# Patient Record
Sex: MALE | Race: WHITE | NOT HISPANIC OR LATINO | Employment: FULL TIME | URBAN - METROPOLITAN AREA
[De-identification: names, ages, dates, MRNs, and addresses within clinical notes are randomized per-mention and may not be internally consistent; named-entity substitution may affect disease eponyms.]

---

## 2018-01-29 ENCOUNTER — COMMUNICATION - HEALTHEAST (OUTPATIENT)
Dept: TELEHEALTH | Facility: CLINIC | Age: 33
End: 2018-01-29

## 2018-01-29 ENCOUNTER — HOSPITAL ENCOUNTER (OUTPATIENT)
Dept: ULTRASOUND IMAGING | Facility: CLINIC | Age: 33
Discharge: HOME | End: 2018-01-29

## 2018-01-29 ENCOUNTER — OFFICE VISIT - HEALTHEAST (OUTPATIENT)
Dept: FAMILY MEDICINE | Facility: CLINIC | Age: 33
End: 2018-01-29

## 2018-01-29 ENCOUNTER — AMBULATORY - HEALTHEAST (OUTPATIENT)
Dept: FAMILY MEDICINE | Facility: CLINIC | Age: 33
End: 2018-01-29

## 2018-01-29 DIAGNOSIS — M71.21 SYNOVIAL CYST OF RIGHT POPLITEAL SPACE: ICD-10-CM

## 2018-01-29 DIAGNOSIS — M79.661 RIGHT CALF PAIN: ICD-10-CM

## 2018-02-02 ENCOUNTER — RECORDS - HEALTHEAST (OUTPATIENT)
Dept: ADMINISTRATIVE | Facility: OTHER | Age: 33
End: 2018-02-02

## 2018-02-06 ENCOUNTER — RECORDS - HEALTHEAST (OUTPATIENT)
Dept: ADMINISTRATIVE | Facility: OTHER | Age: 33
End: 2018-02-06

## 2018-02-16 ENCOUNTER — RECORDS - HEALTHEAST (OUTPATIENT)
Dept: ADMINISTRATIVE | Facility: OTHER | Age: 33
End: 2018-02-16

## 2018-03-30 ENCOUNTER — RECORDS - HEALTHEAST (OUTPATIENT)
Dept: ADMINISTRATIVE | Facility: OTHER | Age: 33
End: 2018-03-30

## 2019-03-03 ENCOUNTER — COMMUNICATION - HEALTHEAST (OUTPATIENT)
Dept: FAMILY MEDICINE | Facility: CLINIC | Age: 34
End: 2019-03-03

## 2019-03-03 ENCOUNTER — RECORDS - HEALTHEAST (OUTPATIENT)
Dept: GENERAL RADIOLOGY | Facility: CLINIC | Age: 34
End: 2019-03-03

## 2019-03-03 ENCOUNTER — OFFICE VISIT - HEALTHEAST (OUTPATIENT)
Dept: FAMILY MEDICINE | Facility: CLINIC | Age: 34
End: 2019-03-03

## 2019-03-03 DIAGNOSIS — R07.89 ATYPICAL CHEST PAIN: ICD-10-CM

## 2019-03-03 DIAGNOSIS — R07.89 OTHER CHEST PAIN: ICD-10-CM

## 2019-03-03 LAB
ALBUMIN SERPL-MCNC: 3.9 G/DL (ref 3.5–5)
ALP SERPL-CCNC: 38 U/L (ref 45–120)
ALT SERPL W P-5'-P-CCNC: 68 U/L (ref 0–45)
ANION GAP SERPL CALCULATED.3IONS-SCNC: 9 MMOL/L (ref 5–18)
AST SERPL W P-5'-P-CCNC: 39 U/L (ref 0–40)
BASOPHILS # BLD AUTO: 0 THOU/UL (ref 0–0.2)
BASOPHILS NFR BLD AUTO: 1 % (ref 0–2)
BILIRUB SERPL-MCNC: 0.3 MG/DL (ref 0–1)
BUN SERPL-MCNC: 19 MG/DL (ref 8–22)
CALCIUM SERPL-MCNC: 9.6 MG/DL (ref 8.5–10.5)
CHLORIDE BLD-SCNC: 105 MMOL/L (ref 98–107)
CO2 SERPL-SCNC: 28 MMOL/L (ref 22–31)
CREAT SERPL-MCNC: 1.27 MG/DL (ref 0.7–1.3)
D DIMER PPP FEU-MCNC: <0.27 FEU UG/ML
EOSINOPHIL # BLD AUTO: 0.2 THOU/UL (ref 0–0.4)
EOSINOPHIL NFR BLD AUTO: 3 % (ref 0–6)
ERYTHROCYTE [DISTWIDTH] IN BLOOD BY AUTOMATED COUNT: 11.9 % (ref 11–14.5)
GFR SERPL CREATININE-BSD FRML MDRD: >60 ML/MIN/1.73M2
GLUCOSE BLD-MCNC: 88 MG/DL (ref 70–125)
HCT VFR BLD AUTO: 44.8 % (ref 40–54)
HGB BLD-MCNC: 14.8 G/DL (ref 14–18)
LYMPHOCYTES # BLD AUTO: 2.5 THOU/UL (ref 0.8–4.4)
LYMPHOCYTES NFR BLD AUTO: 39 % (ref 20–40)
MCH RBC QN AUTO: 28.8 PG (ref 27–34)
MCHC RBC AUTO-ENTMCNC: 33 G/DL (ref 32–36)
MCV RBC AUTO: 87 FL (ref 80–100)
MONOCYTES # BLD AUTO: 0.5 THOU/UL (ref 0–0.9)
MONOCYTES NFR BLD AUTO: 9 % (ref 2–10)
NEUTROPHILS # BLD AUTO: 3.1 THOU/UL (ref 2–7.7)
NEUTROPHILS NFR BLD AUTO: 49 % (ref 50–70)
PLATELET # BLD AUTO: 386 THOU/UL (ref 140–440)
PMV BLD AUTO: 8.7 FL (ref 8.5–12.5)
POTASSIUM BLD-SCNC: 4.4 MMOL/L (ref 3.5–5)
PROT SERPL-MCNC: 7.3 G/DL (ref 6–8)
RBC # BLD AUTO: 5.13 MILL/UL (ref 4.4–6.2)
SODIUM SERPL-SCNC: 142 MMOL/L (ref 136–145)
TROPONIN I SERPL-MCNC: <0.01 NG/ML (ref 0–0.29)
WBC: 6.4 THOU/UL (ref 4–11)

## 2019-03-06 ENCOUNTER — OFFICE VISIT - HEALTHEAST (OUTPATIENT)
Dept: FAMILY MEDICINE | Facility: CLINIC | Age: 34
End: 2019-03-06

## 2019-03-06 DIAGNOSIS — H53.9 VISION CHANGES: ICD-10-CM

## 2019-03-06 DIAGNOSIS — R07.89 CHEST PRESSURE: ICD-10-CM

## 2019-03-07 LAB
ATRIAL RATE - MUSE: 54 BPM
DIASTOLIC BLOOD PRESSURE - MUSE: NORMAL MMHG
INTERPRETATION ECG - MUSE: NORMAL
P AXIS - MUSE: 57 DEGREES
PR INTERVAL - MUSE: 178 MS
QRS DURATION - MUSE: 86 MS
QT - MUSE: 404 MS
QTC - MUSE: 383 MS
R AXIS - MUSE: 47 DEGREES
SYSTOLIC BLOOD PRESSURE - MUSE: NORMAL MMHG
T AXIS - MUSE: 1 DEGREES
VENTRICULAR RATE- MUSE: 54 BPM

## 2021-05-31 VITALS — BODY MASS INDEX: 35.82 KG/M2 | WEIGHT: 264.1 LBS

## 2021-06-02 VITALS — BODY MASS INDEX: 39.24 KG/M2 | WEIGHT: 289.3 LBS

## 2021-06-02 VITALS — BODY MASS INDEX: 38.92 KG/M2 | WEIGHT: 287 LBS

## 2021-06-15 NOTE — PROGRESS NOTES
Chief Complaint   Patient presents with     calf pain     going on for 1-2 weeks. pain started behind the knee         HPI     Jak Ortega is a 32 y.o. male seen today for right calf pain and swelling.  About 2 weeks ago he woke up with pain behind the right knee which continued for several days and slowly seemed to get better.  Over the last 2 or 3 days he has had pain and swelling in the right calf.  Particularly notes pain when dorsiflexing his foot to take a step.  Denies fever, chills, or nausea.  Denies recent travel or immobility.  Denies recent trauma to his leg.     No current outpatient prescriptions on file.     No current facility-administered medications for this visit.         Reviewed and updated: medical history, medications and allergies.     Review of Systems     General: Denies fever, chills, fatigue.  Cardiovascular: Denies chest pain, dyspnea on exertion, palpitations.  Respiratory: Denies dyspnea, cough, wheezing.  GI: Denies nausea, vomiting, diarrhea, constipation.  : Denies dysuria, polyuria.     Objective     Vitals:    01/29/18 1456   BP: 114/68   Pulse: 74   Temp: 97.6  F (36.4  C)   TempSrc: Oral   SpO2: 99%   Weight: (!) 264 lb 1.6 oz (119.8 kg)        Reviewed vital signs.  General: Appears calm, comfortable. Answers questions quickly and appropriately with clear speech. No apparent distress.  Skin: Pink, warm, dry.  HENT: Normocephalic, atraumatic.  Neck: Supple.  Heart: Strong, regular radial pulse.  Lungs: Normal respiratory effort.  Neuro: Memory and cognition appear normal. Normal gait.  Psych: Mood and affect appear normal.   MSK:  R knee: No redness, swelling, deformity.  Exhibits full active range of motion and bears weight without discomfort.  Knee is stable.  No posterior tenderness.  R calf: Circumference of right calf is 3 cm greater (48 cm) than the left (45 cm).  No rash or erythema.  Calf muscle is soft but very tender.  Resisted dorsiflexion of the foot elicits  calf pain.     Us Venous Leg Right    Result Date: 1/29/2018  US VENOUS LEG RIGHT 1/29/2018 6:33 PM INDICATION: Calf pain and swelling. TECHNIQUE: Routine exam without and with compression, augmentation, and duplex utilizing 2D gray-scale imaging, Doppler interrogation with color-flow and spectral waveform analysis. COMPARISON: None. FINDINGS: The common femoral, femoral, popliteal, and segmentally visualized calf veins were evaluated. The opposite CFV was also included in the evaluation. Right leg veins are negative for deep venous thrombosis. No popliteal cysts.     CONCLUSION: 1.  Right leg veins are negative for DVT. 2.  Small right popliteal fossa Baker's cyst.      No results found for this or any previous visit.       Medical Decision-Making     Jak is a healthy-appearing 32-year-old male who presents with right calf swelling and tenderness.  He has no risk factors for thromboembolus, however given the clear difference in size and the market tenderness in the calf muscle a venous ultrasound seems indicated.  While not described in the radiologist report, the verbal report to me noted a Baker's cyst extending down into the muscle of the calf and is the likely cause of the pain and swelling.  No DVTs were found.  He will resume normal activity and follow-up with orthopedics.     Assessment and Plan     Jak was seen today for calf pain.    Diagnoses and all orders for this visit:    Synovial cyst of right popliteal space  Comments:  Per US, extending down into the calf muscle.  Orders:  -     Ambulatory referral to Orthopedics    Right calf pain  -     US Venous Leg Right        Discussed benefit vs risk of medications, dosing, side effects.  Patient was able to verbalize understanding.  After visit summary was provided for patient.     Dion Jaramillo PA-C

## 2021-06-17 NOTE — PATIENT INSTRUCTIONS - HE
Patient Instructions by Armaan Jones MD at 3/3/2019  3:00 PM     Author: Armaan Jones MD Service: -- Author Type: Physician    Filed: 3/3/2019  4:55 PM Encounter Date: 3/3/2019 Status: Addendum    : Armaan Jones MD (Physician)    Related Notes: Original Note by Armaan Jones MD (Physician) filed at 3/3/2019  4:54 PM       - Your chest x-ray is normal / negative.   - Your EKG is normal except for a slightly low heart rate, which is expected since you exercise regularly.   - A GI cocktail was given in clinic today with some improvement in your chest symptoms. Recommend that you stop your workout supplement, as this could be contributing to your chest pain. Also recommend that you start Zantac (ranitidine) two times a day to treat possible esophagitis.   - Tests for your blood cell counts, electrolytes, kidney function, liver function, and blood sugar level are in process.   - Tests to rule out heart muscle damage and blood clots in your lungs are in process.   - You will be contacted with laboratory results later this evening.   - Follow up with your PCP within the next 3 days to discuss your symptoms and determine if any additional workup or treatment is indicated.   - Recommend that you schedule an eye examination ASAP.       Patient Education     Uncertain Causes of Chest Pain    Chest pain can happen for a number of reasons. Sometimes the cause can't be determined. If your condition does not seem serious, and your pain does not appear to be coming from your heart, your healthcare provider may recommend watching it closely. Sometimes the signs of a serious problem take more time to appear. Many problems not related to your heart can cause chest pain.These include:    Musculoskeletal. Costochondritis, an inflammation of the tissues around the ribs that can occur from trauma or overuse injuries    Respiratory. Pneumonia, pneumothorax, or pneumonitis (inflammation of the lining of the  chest and lungs)    Gastrointestinal. Esophageal reflux, heartburn, or gallbladder disease    Anxiety and panic disorders    Nerve compression and neuritis    Miscellaneous problems such as aortic aneurysm or pulmonary embolism (a blood clot in the lungs)  Home care  After your visit, follow these recommendations:    Rest today and avoid strenuous activity.    Take any prescribed medicine as directed.    Be aware of any recurrent chest pain and notice any changes  Follow-up care  Follow up with your healthcare provider if you do not start to feel better within 24 hours, or as advised.  Call 911  Call 911 if any of these occur:    A change in the type of pain: if it feels different, becomes more severe, lasts longer, or begins to spread into your shoulder, arm, neck, jaw or back    Shortness of breath or increased pain with breathing    Weakness, dizziness, or fainting    Rapid heart beat    Crushing sensation in your chest  When to seek medical advice  Call your healthcare provider right away if any of the following occur:    Cough with dark colored sputum (phlegm) or blood    Fever of 100.4 F (38 C) or higher, or as directed by your healthcare provider    Swelling, pain or redness in one leg    Shortness of breath  Date Last Reviewed: 12/30/2015 2000-2017 [employer]. [address] [place] [address], [place] [address]. All rights reserved. This information is not intended as a substitute for professional medical care. Always follow your healthcare professional's instructions.

## 2021-06-24 NOTE — PROGRESS NOTES
ASSESSMENT:  1. Chest pressure  Patient with recent onset of intermittent recurrent chest pressure, nonexertional, workup thus far negative.  My strong suspicion is that this is related to a noncardiac cause such as anxiety muscle strain or potentially GERD.    2. Vision changes  Patient is having some vision changes probably an unrelated issue        PLAN:  1.  I do encourage the patient to make an appointment with his eye doctor  2.  In terms of the chest pressure overall reassurance and watchful waiting, if this persists or worsens could consider an echocardiogram to rule out any type of valvular or structural heart disease  3.  Follow-up as needed.       No orders of the defined types were placed in this encounter.    There are no discontinued medications.    Return for with PCP .    CHIEF COMPLAINT:  Chief Complaint   Patient presents with     Follow-up     from [facility] for chest pains. Pt says for the most part they have resolved, but still notices some heaviness when he lays down in the evening        SUBJECTIVE:  Jak is a 33 y.o. male who presents for a walk-in clinic follow-up which occurred three days ago. Patient explains that he was seen at the walk-in clinic for chest pain, blurred vision, and dizziness. The pain was located in the center of his chest and he was experiencing chest heaviness and tightness. He notes that he no longer experiences chest tightness but his chest still feels heavy especially when he lays down at night. He has some shortness of breath. He has not experienced any issues like this in the past. He has had some anxiety in the past but it has never caused him chest issues. He mentions that he has been worried about his vision lately. He has always had worse vision in his left eye but his right eye is starting to decline as well. His vision seems cloudy and it is more difficult for him to read. He has glasses but he does not wear them. His father passed away from amyloidosis. He has  not seen an eye specialist in awhile but plans on scheduling an appointment soon. He denies any other stressors in his life.     REVIEW OF SYSTEMS:   All other systems are negative.    PFSH:  Family: His father passed away from amyloidosis. His father had diabetes.     Immunization History   Administered Date(s) Administered     DTP 1987, 02/01/1988     Dtap 1987, 04/19/1988, 08/23/1988, 04/11/1989     Influenza,live, Nasal Laiv4 10/10/2014     Influenza,seasonal quad, PF, 36+MOS 10/01/2014     MMR 04/24/1989     OPV,Trivalent,Historic([id number] only) 1987     POLIO, Unspecified 1987, 04/19/1988, 04/11/1989     Tdap 10/31/2014     Social History     Socioeconomic History     Marital status:      Spouse name: Promise     Number of children: 2     Years of education: Not on file     Highest education level: Not on file   Occupational History     Occupation: construction   Social Needs     Financial resource strain: Not on file     Food insecurity:     Worry: Not on file     Inability: Not on file     Transportation needs:     Medical: Not on file     Non-medical: Not on file   Tobacco Use     Smoking status: Never Smoker     Smokeless tobacco: Never Used   Substance and Sexual Activity     Alcohol use: Yes     Alcohol/week: 4.0 oz     Types: 8 drink(s) per week     Drug use: No     Sexual activity: Yes     Partners: Female   Lifestyle     Physical activity:     Days per week: Not on file     Minutes per session: Not on file     Stress: Not on file   Relationships     Social connections:     Talks on phone: Not on file     Gets together: Not on file     Attends Yazidi service: Not on file     Active member of club or organization: Not on file     Attends meetings of clubs or organizations: Not on file     Relationship status: Not on file     Intimate partner violence:     Fear of current or ex partner: Not on file     Emotionally abused: Not on file     Physically abused: Not on file      Forced sexual activity: Not on file   Other Topics Concern     Not on file   Social History Narrative     Not on file     History reviewed. No pertinent past medical history.  Family History   Problem Relation Age of Onset     No Medical Problems Mother      Other Father         Amyloidosis,  at age 64     Diabetes Father      Heart disease Paternal Grandmother      No Medical Problems Brother      No Medical Problems Brother        MEDICATIONS:  Current Outpatient Medications   Medication Sig Dispense Refill     ranitidine (ZANTAC) 150 MG tablet Take 1 tablet (150 mg total) by mouth 2 (two) times a day. 60 tablet 0     No current facility-administered medications for this visit.        TOBACCO USE:  Social History     Tobacco Use   Smoking Status Never Smoker   Smokeless Tobacco Never Used       VITALS:  Vitals:    19 1523   BP: 141/76   Pulse: 65   SpO2: 98%   Weight: (!) 289 lb 4.8 oz (131.2 kg)     Wt Readings from Last 3 Encounters:   19 (!) 289 lb 4.8 oz (131.2 kg)   19 (!) 287 lb (130.2 kg)   18 (!) 264 lb 1.6 oz (119.8 kg)       PHYSICAL EXAM:  Constitutional:   Reveals a healthy appearing male.  Vitals: per nursing notes.  HEENT:  Ears:  External canals, TMs clear.    Eyes:  EOMs full, PERRL.  Lungs: Clear to A&P without rales or wheezes.  Respiratory effort normal.  Cardiac:   Regular rate and rhythm, normal S1, S2, no murmur or gallop.  Musculoskeletal: No peripheral swelling.  Neuro:  Alert and oriented. Cranial nerves, motor, sensory exams are intact.  No gross focal deficits.  Psychiatric:  Memory intact, mood appropriate.    QUALITY MEASURES:      DATA REVIEWED:  Additional History from Old Records Summarized (2): Reviewed walk-in note from 3/03/19: Chest pain, blurred vision.   Decision to Obtain Records (1):   Radiology Tests Summarized or Ordered (1): Reviewed CXR from 3/03/19: Negative.   Labs Reviewed or Ordered (1): Reviewed labs from 3/03/19: CBC: Normal,  Comprehensive metabolic profile: Essentially normal.   Medicine Test Summarized or Ordered (1): Reviewed EKG from 3/03/19: Normal.  Independent Review of EKG, X-RAY, or RAPID STREP (2 each):    The visit lasted a total of 14 minutes face to face with the patient. Over 50% of the time was spent counseling and educating the patient about his above concerns.    By signing my name below, I, James Montaño, attest that this documentation has been prepared under the direction and in the presence of Dr. Carl Stout.  Electronic Signature: Horace Daley. 3/06/2019 3:25 PM.    I, Dr. Stout, personally performed the services described in this documentation. All medical record entries made by the scribe were at my direction and in my presence. I have reviewed the chart and discharge instructions (if applicable) and agree that the record reflects my personal performance and is accurate and complete.      Total data points: 5

## 2021-06-24 NOTE — PROGRESS NOTES
"Subjective:   Jak Ortega is a(n) 33 y.o. White or  male who presents to [facility] with the following complaint(s):  Chest Pain (1 days); Blurred Vision; Headache; Dizziness; Chest Pain; and chest heaviness    History of Present Illness:  Primary symptom: Chest pain  Onset: Yesterday  Progression: Worsening  Location: Anterior central chest  Quality: Tightness  Radiation: No  Intensity: 7/10 at its worst  Frequency: Constant discomfort, more intense periodically  Exacerbating factors: Activity, carrying his daughter, \"getting worked up\"  Relieving factors: Relaxing  Associated shortness of breath: At times today  Associated palpitations: Yes, intermittently  Associated diaphoresis: Minimal, at times  Associated nausea: Slight for the past couple of hours  Additional symptoms: Feels dizzy. Vision is blurry; has chronic poor vision and droop of the left eye. Has glasses for distance vision but does not wear them. Has an occipital headache.   History of similar pain: Yes, but milder  History of coronary artery disease: No  History of pulmonary embolism: No  History of deep vein thrombosis: No  History of gastroesophageal reflux disease: No  Family history of coronary artery disease: No  Family history of thromboembolic disease: Father had DVT's; he had amyloidosis.   Additional pertinent history: Took an antidepressant for a short time approximately 3-4 years ago; stopped it because it made his symptoms worse.   Known injury: No. Lifts weights daily. Did bench presses yesterday but does not feel that this is contributing to his symptoms. Recently started taking a new testosterone-boosting weight lifting supplement.   Tobacco user / exposure: No    The following portions of the patient's history were reviewed and updated as appropriate: allergies, current medications, past family history, past medical history, past social history, past surgical history and problem list.    Patient Active Problem List    " Diagnosis Date Noted     Anxiety and depression 2016     Past Medical History:   Diagnosis Date     Anxiety and depression 2016     History reviewed. No pertinent surgical history.    Family History   Problem Relation Age of Onset     No Medical Problems Mother      Other Father         Amyloidosis,  at age 64     Diabetes Father      Heart disease Paternal Grandmother      No Medical Problems Brother      No Medical Problems Brother      Social History     Socioeconomic History     Marital status:      Spouse name: Promise     Number of children: 2     Years of education: None     Highest education level: None   Occupational History     Occupation: construction   Social Needs     Financial resource strain: None     Food insecurity:     Worry: None     Inability: None     Transportation needs:     Medical: None     Non-medical: None   Tobacco Use     Smoking status: Never Smoker     Smokeless tobacco: Never Used   Substance and Sexual Activity     Alcohol use: Yes     Alcohol/week: 4.0 oz     Types: 8 drink(s) per week     Drug use: No     Sexual activity: Yes     Partners: Female   Lifestyle     Physical activity:     Days per week: None     Minutes per session: None     Stress: None   Relationships     Social connections:     Talks on phone: None     Gets together: None     Attends Yarsanism service: None     Active member of club or organization: None     Attends meetings of clubs or organizations: None     Relationship status: None     Intimate partner violence:     Fear of current or ex partner: None     Emotionally abused: None     Physically abused: None     Forced sexual activity: None   Other Topics Concern     None   Social History Narrative     None       Review of Systems:   Review of Systems   All other systems reviewed and are negative.    Objective:     Vitals:    19 1521   BP: 123/81   Patient Site: Right Arm   Patient Position: Sitting   Cuff Size: Adult Regular   Pulse:  65   Resp: 20   Temp: 98.2  F (36.8  C)   TempSrc: Oral   SpO2: 98%   Weight: (!) 287 lb (130.2 kg)     Physical Exam   Constitutional: He is oriented to person, place, and time. He appears well-developed and well-nourished.  Non-toxic appearance. He does not appear ill. No distress.   HENT:   Head: Normocephalic and atraumatic.   Right Ear: Tympanic membrane, external ear and ear canal normal.   Left Ear: Tympanic membrane, external ear and ear canal normal.   Nose: No mucosal edema or rhinorrhea.   Mouth/Throat: Uvula is midline, oropharynx is clear and moist and mucous membranes are normal. No oral lesions.   Eyes: Conjunctivae and lids are normal.   Ptosis on the left.    Neck: Neck supple. No edema and no erythema present.   Cardiovascular: Normal rate, regular rhythm, S1 normal and S2 normal. Exam reveals no gallop and no friction rub.   No murmur heard.  Pulmonary/Chest: Effort normal and breath sounds normal. No stridor. He has no wheezes. He has no rhonchi. He has no rales. He exhibits no tenderness, no bony tenderness, no crepitus and no deformity.   Abdominal: Soft. Bowel sounds are normal. He exhibits no distension and no mass. There is no tenderness.   Lymphadenopathy:     He has no cervical adenopathy.   Neurological: He is alert and oriented to person, place, and time. He has normal strength. No cranial nerve deficit or sensory deficit. GCS eye subscore is 4. GCS verbal subscore is 5. GCS motor subscore is 6.   Skin: Skin is warm and dry. Capillary refill takes less than 2 seconds. No rash noted. He is not diaphoretic. No pallor.   Nursing note and vitals reviewed.    Laboratory:  Results for orders placed or performed in visit on 03/03/19   Comprehensive Metabolic Panel   Result Value Ref Range    Sodium 142 136 - 145 mmol/L    Potassium 4.4 3.5 - 5.0 mmol/L    Chloride 105 98 - 107 mmol/L    CO2 28 22 - 31 mmol/L    Anion Gap, Calculation 9 5 - 18 mmol/L    Glucose 88 70 - 125 mg/dL    BUN 19 8 - 22  mg/dL    Creatinine 1.27 0.70 - 1.30 mg/dL    GFR MDRD Af Amer >60 >60 mL/min/1.73m2    GFR MDRD Non Af Amer >60 >60 mL/min/1.73m2    Bilirubin, Total 0.3 0.0 - 1.0 mg/dL    Calcium 9.6 8.5 - 10.5 mg/dL    Protein, Total 7.3 6.0 - 8.0 g/dL    Albumin 3.9 3.5 - 5.0 g/dL    Alkaline Phosphatase 38 (L) 45 - 120 U/L    AST 39 0 - 40 U/L    ALT 68 (H) 0 - 45 U/L   Troponin I   Result Value Ref Range    Troponin I <0.01 0.00 - 0.29 ng/mL   D-dimer, Quantitative   Result Value Ref Range    D-Dimer, Quant <0.27 <=0.50 FEU ug/mL   HM1 (CBC with Diff)   Result Value Ref Range    WBC 6.4 4.0 - 11.0 thou/uL    RBC 5.13 4.40 - 6.20 mill/uL    Hemoglobin 14.8 14.0 - 18.0 g/dL    Hematocrit 44.8 40.0 - 54.0 %    MCV 87 80 - 100 fL    MCH 28.8 27.0 - 34.0 pg    MCHC 33.0 32.0 - 36.0 g/dL    RDW 11.9 11.0 - 14.5 %    Platelets 386 140 - 440 thou/uL    MPV 8.7 8.5 - 12.5 fL    Neutrophils % 49 (L) 50 - 70 %    Lymphocytes % 39 20 - 40 %    Monocytes % 9 2 - 10 %    Eosinophils % 3 0 - 6 %    Basophils % 1 0 - 2 %    Neutrophils Absolute 3.1 2.0 - 7.7 thou/uL    Lymphocytes Absolute 2.5 0.8 - 4.4 thou/uL    Monocytes Absolute 0.5 0.0 - 0.9 thou/uL    Eosinophils Absolute 0.2 0.0 - 0.4 thou/uL    Basophils Absolute 0.0 0.0 - 0.2 thou/uL       Radiology:  XR CHEST 2 VIEWS  3/3/2019 4:37 PM    INDICATION: Other chest pain  COMPARISON: 01/24/2016    FINDINGS: Negative chest.    Electrocardiogram:  Results for orders placed or performed in visit on 03/03/19   Electrocardiogram Perform and Read   Result Value Ref Range    SYSTOLIC BLOOD PRESSURE  mmHg    DIASTOLIC BLOOD PRESSURE  mmHg    VENTRICULAR RATE 54 BPM    ATRIAL RATE 54 BPM    P-R INTERVAL 178 ms    QRS DURATION 86 ms    Q-T INTERVAL 404 ms    QTC CALCULATION (BEZET) 383 ms    P Axis 57 degrees    R AXIS 47 degrees    T AXIS 1 degrees    MUSE DIAGNOSIS       Sinus bradycardia  Otherwise normal ECG  No previous ECGs available         Assessment/Plan   1. Atypical chest pain  -  Electrocardiogram Perform and Read  - XR Chest 2 Views; Future  - HM1(CBC and Differential)  - Comprehensive Metabolic Panel  - Troponin I  - D-dimer, Quantitative  - aluminum-magnesium hydroxide-simethicone 15 mL, viscous lidocaine HC 15 mL (GI COCKTAIL)  - HM1 (CBC with Diff)  - ranitidine (ZANTAC) 150 MG tablet; Take 1 tablet (150 mg total) by mouth 2 (two) times a day.  Dispense: 60 tablet; Refill: 0    - Above listed studies completed to evaluate for cardiac arrhythmia, cardiac ischemia, thromboembolic disease, pneumothorax, pneumonia, anemia, leukocytosis, and metabolic abnormalities.   - GI Cocktail administered in clinic with patient reporting some improvement in his chest pain following administration of this medication.   - Reviewed normal EKG and chest x-ray results with patient in clinic.   - Reviewed results of Troponin (negative), D-Dimer (negative), CBC (normal), and CMP (normal except for slightly elevated ALT) with patient by phone.   - Starting ranitidine as listed above for empiric treatment of gastroesophageal reflux disease since patient's symptoms did improve with a GI Cocktail.   - Suspect that anxiety may be a contributing factor at this time as well.   - Counseled patient regarding assessment and plan for evaluation and treatment. Questions were answered. See AVS for the specific written instructions and educational handout(s) regarding chest pain that were provided at the conclusion of the visit.   - Discussed signs / symptoms that warrant urgent / emergent medical attention.   - Follow up with Dr. Stout scheduled on 3/6/2019. Instructed patient to go to the ER if his symptoms worsen in the meantime.     Armaan Jones MD

## 2021-06-24 NOTE — TELEPHONE ENCOUNTER
Spoke with patient this evening. Reviewed all laboratory results with him. Advised him that:  - Blood cell counts are normal.   - Electrolytes and kidney function are normal.   - Random blood glucose is normal.   - Liver function tests are normal except for a minimally elevated ALT, which is non-specific given that his AST is normal.   - Troponin is negative, which effectively rules out recent cardiac ischemia as the cause of his chest pain.   - D-Dimer is negative, which effectively rules out pulmonary embolism as the cause of his chest pain.     Patient voiced understanding of these results. Follow up is scheduled with Dr. Stout on 3/6/2019. Recommended that he go to the ED if his symptoms worsen in the interim.     Armaan Jones MD 03/03/19 7:00 PM

## 2022-11-22 ENCOUNTER — OFFICE VISIT (OUTPATIENT)
Dept: FAMILY MEDICINE | Facility: CLINIC | Age: 37
End: 2022-11-22
Payer: COMMERCIAL

## 2022-11-22 VITALS
OXYGEN SATURATION: 100 % | HEART RATE: 87 BPM | WEIGHT: 260 LBS | TEMPERATURE: 98.3 F | RESPIRATION RATE: 16 BRPM | DIASTOLIC BLOOD PRESSURE: 71 MMHG | BODY MASS INDEX: 35.26 KG/M2 | SYSTOLIC BLOOD PRESSURE: 107 MMHG

## 2022-11-22 DIAGNOSIS — R68.89 FLU-LIKE SYMPTOMS: Primary | ICD-10-CM

## 2022-11-22 PROCEDURE — 99203 OFFICE O/P NEW LOW 30 MIN: CPT | Performed by: PHYSICIAN ASSISTANT

## 2022-11-22 RX ORDER — BENZONATATE 100 MG/1
100 CAPSULE ORAL 3 TIMES DAILY PRN
Qty: 30 CAPSULE | Refills: 0 | Status: SHIPPED | OUTPATIENT
Start: 2022-11-22 | End: 2022-12-02

## 2022-11-22 RX ORDER — ALBUTEROL SULFATE 90 UG/1
2 AEROSOL, METERED RESPIRATORY (INHALATION) EVERY 6 HOURS
Qty: 18 G | Refills: 0 | Status: SHIPPED | OUTPATIENT
Start: 2022-11-22 | End: 2023-02-20

## 2022-11-22 RX ORDER — OSELTAMIVIR PHOSPHATE 75 MG/1
75 CAPSULE ORAL 2 TIMES DAILY
Qty: 10 CAPSULE | Refills: 0 | Status: SHIPPED | OUTPATIENT
Start: 2022-11-22 | End: 2022-11-27

## 2022-11-22 NOTE — PROGRESS NOTES
Patient presents with:  Cough: Cough started last night chest hurts to cough      Clinical Decision Making:  COVID-19 screening test was negative at home today. Tamiflu is written for treatment of flu.  Symptomatic care was gone over. Expected course of resolution and indication for return was gone over and questions were answered to patient/parent's satisfaction before discharge.        ICD-10-CM    1. Flu-like symptoms  R68.89 oseltamivir (TAMIFLU) 75 MG capsule     albuterol (PROAIR HFA/PROVENTIL HFA/VENTOLIN HFA) 108 (90 Base) MCG/ACT inhaler     benzonatate (TESSALON) 100 MG capsule          Patient Instructions     Your rapid influenza test came back positive for flu. You are contagious until your fever is gone for 24 hours. Maintain good hand hygiene, cover your cough, and limit contact to prevent spreading the illness. Symptoms typically last 1-2 weeks.    Symptom management:  - Drink plenty of fluids and allow for plenty of rest  - Use tylenol or ibuprofen every 4-6 hours for fever/discomfort    Reasons to be seen immediately for re-evaluation:  - Have trouble breathing or are short of breath  - Feel pain or pressure in your chest or belly  - Get suddenly dizzy  - Feel confused  - Have severe vomiting    If no symptom improvement in 1 week, follow-up with your primary care provider.        HPI:  Jak Ortega is a 37 year old male who presents today for a 2-week history of cold-like symptoms and a 1 day cute onset of Influenza like illness symptoms to include fever, dry nonproductive cough, sore throat, odynophagia, rhinorrhea, myalgias, arthralgias, headache and fatigue.  Patient had change in his symptoms very acutely since yesterday.  He also has 3 children at home that have been sick.  His children and wife had had COVID 3 weeks ago.  He had a negative COVID test this morning.    Patient had acute onset of all the above symptoms.    Patient has not had a seasonal influenza immunization.    Last dose of  antipyretic.  None.  Temperature in the office is currently 98.3.    Anorexia: yes.    Patient is taking fluids and is micturating.    History obtained from chart review and the patient.    Problem List:  2016-02: Anxiety and depression      No past medical history on file.    Social History     Tobacco Use     Smoking status: Never     Smokeless tobacco: Never   Substance Use Topics     Alcohol use: Yes     Alcohol/week: 6.7 standard drinks       Review of Systems  As above in HPI otherwise negative.    Vitals:    11/22/22 1247   BP: 107/71   Pulse: 87   Resp: 16   Temp: 98.3  F (36.8  C)   TempSrc: Oral   SpO2: 100%   Weight: 117.9 kg (260 lb)       General: Patient is resting comfortably no acute distress is afebrile  Patient does not appear acutely ill toxic or dehydrated but does appear to be fatigued.  HEENT: Head is normocephalic atraumatic   eyes are PERRL EOMI sclera anicteric   TMs are clear bilaterally  Throat is with mild pharyngeal wall erythema and no exudate  No cervical lymphadenopathy present  LUNGS: Clear to auscultation bilaterally normal respiratory effort and excursion  HEART: Regular rate and rhythm  Skin: Without rash non-diaphoretic    Physical Exam    At the end of the encounter, I discussed results, diagnosis, medications. Discussed red flags for immediate return to clinic/ER, as well as indications for follow up if no improvement. Patient understood and agreed to plan. Patient was stable for discharge.

## 2022-11-22 NOTE — PATIENT INSTRUCTIONS
Your rapid influenza test came back positive for flu. You are contagious until your fever is gone for 24 hours. Maintain good hand hygiene, cover your cough, and limit contact to prevent spreading the illness. Symptoms typically last 1-2 weeks.    Symptom management:  - Drink plenty of fluids and allow for plenty of rest  - Use tylenol or ibuprofen every 4-6 hours for fever/discomfort    Reasons to be seen immediately for re-evaluation:  - Have trouble breathing or are short of breath  - Feel pain or pressure in your chest or belly  - Get suddenly dizzy  - Feel confused  - Have severe vomiting    If no symptom improvement in 1 week, follow-up with your primary care provider.

## 2023-02-13 ASSESSMENT — ENCOUNTER SYMPTOMS
DIARRHEA: 0
COUGH: 0
FREQUENCY: 0
HEADACHES: 1
MYALGIAS: 1
CONSTIPATION: 0
DYSURIA: 0
HEMATURIA: 0
WEAKNESS: 0
CHILLS: 0
NAUSEA: 0
NERVOUS/ANXIOUS: 1
ABDOMINAL PAIN: 0
SORE THROAT: 0
PARESTHESIAS: 0
HEMATOCHEZIA: 0
EYE PAIN: 0
PALPITATIONS: 0
DIZZINESS: 0
FEVER: 0
ARTHRALGIAS: 1
JOINT SWELLING: 0
SHORTNESS OF BREATH: 0

## 2023-02-20 ENCOUNTER — OFFICE VISIT (OUTPATIENT)
Dept: FAMILY MEDICINE | Facility: CLINIC | Age: 38
End: 2023-02-20
Payer: COMMERCIAL

## 2023-02-20 VITALS
OXYGEN SATURATION: 98 % | WEIGHT: 265 LBS | TEMPERATURE: 98.4 F | DIASTOLIC BLOOD PRESSURE: 78 MMHG | RESPIRATION RATE: 16 BRPM | BODY MASS INDEX: 35.89 KG/M2 | HEIGHT: 72 IN | HEART RATE: 64 BPM | SYSTOLIC BLOOD PRESSURE: 118 MMHG

## 2023-02-20 DIAGNOSIS — R53.83 FATIGUE, UNSPECIFIED TYPE: ICD-10-CM

## 2023-02-20 DIAGNOSIS — Z00.00 PHYSICAL EXAM, ROUTINE: Primary | ICD-10-CM

## 2023-02-20 DIAGNOSIS — Z11.59 NEED FOR HEPATITIS C SCREENING TEST: ICD-10-CM

## 2023-02-20 DIAGNOSIS — Z11.4 SCREENING FOR HIV (HUMAN IMMUNODEFICIENCY VIRUS): ICD-10-CM

## 2023-02-20 DIAGNOSIS — Z13.220 SCREENING FOR HYPERLIPIDEMIA: ICD-10-CM

## 2023-02-20 PROCEDURE — 99213 OFFICE O/P EST LOW 20 MIN: CPT | Mod: 25 | Performed by: FAMILY MEDICINE

## 2023-02-20 PROCEDURE — 99395 PREV VISIT EST AGE 18-39: CPT | Performed by: FAMILY MEDICINE

## 2023-02-20 ASSESSMENT — ENCOUNTER SYMPTOMS
CHILLS: 0
HEADACHES: 1
SORE THROAT: 0
FREQUENCY: 0
COUGH: 0
JOINT SWELLING: 0
DIZZINESS: 0
SHORTNESS OF BREATH: 0
DIARRHEA: 0
CONSTIPATION: 0
HEMATURIA: 0
PALPITATIONS: 0
ABDOMINAL PAIN: 0
NERVOUS/ANXIOUS: 1
EYE PAIN: 0
MYALGIAS: 1
HEMATOCHEZIA: 0
WEAKNESS: 0
PARESTHESIAS: 0
ARTHRALGIAS: 1
DYSURIA: 0
NAUSEA: 0
FEVER: 0

## 2023-02-20 ASSESSMENT — PAIN SCALES - GENERAL: PAINLEVEL: NO PAIN (0)

## 2023-02-20 NOTE — PROGRESS NOTES
SUBJECTIVE:   CC: Jak is an 37 year old who presents for preventative health visit.   Patient has been advised of split billing requirements and indicates understanding: Yes  1)  Urinating, more frequent  2)  Wondering about decreased testosterone-  Pt reports decreased energy, and harder to maintain.     3)   Low back pain  4)  Eye sight    Healthy Habits:     Getting at least 3 servings of Calcium per day:  Yes    Bi-annual eye exam:  NO    Dental care twice a year:  Yes    Sleep apnea or symptoms of sleep apnea:  None    Diet:  Regular (no restrictions)    Frequency of exercise:  4-5 days/week    Duration of exercise:  45-60 minutes    Taking medications regularly:  Yes    Medication side effects:  None    PHQ-2 Total Score: 0    Additional concerns today:  Yes     Today's PHQ-2 Score:   PHQ-2 ( 1999 Pf[employer]) 2/13/2023   Q1: Little interest or pleasure in doing things 0   Q2: Feeling down, depressed or hopeless 0   PHQ-2 Score 0   Q1: Little interest or pleasure in doing things Not at all   Q2: Feeling down, depressed or hopeless Not at all   PHQ-2 Score 0         Social History     Tobacco Use     Smoking status: Never     Smokeless tobacco: Never   Substance Use Topics     Alcohol use: Yes     Alcohol/week: 6.7 standard drinks     If you drink alcohol do you typically have >3 drinks per day or >7 drinks per week? No    Alcohol Use 2/13/2023   Prescreen: >3 drinks/day or >7 drinks/week? No       Reviewed orders with patient. Reviewed health maintenance and updated orders accordingly - Yes      Reviewed and updated as needed this visit by clinical staff    Allergies  Meds   Med Hx  Surg Hx  Fam Hx          Reviewed and updated as needed this visit by Provider       Med Hx  Surg Hx  Fam Hx             Review of Systems   Constitutional: Negative for chills and fever.   HENT: Negative for congestion, ear pain and sore throat.    Eyes: Positive for visual disturbance. Negative for pain.   Respiratory:  Negative for cough and shortness of breath.    Cardiovascular: Negative for chest pain, palpitations and peripheral edema.   Gastrointestinal: Negative for abdominal pain, constipation, diarrhea, hematochezia and nausea.   Genitourinary: Negative for dysuria, frequency, genital sores, hematuria, impotence, penile discharge and urgency.   Musculoskeletal: Positive for arthralgias and myalgias. Negative for joint swelling.   Skin: Negative for rash.   Neurological: Positive for headaches. Negative for dizziness, weakness and paresthesias.   Psychiatric/Behavioral: Positive for mood changes. The patient is nervous/anxious.          OBJECTIVE:   /78   Pulse 64   Temp 98.4  F (36.9  C)   Resp 16   Ht 1.829 m (6')   Wt 120.2 kg (265 lb)   SpO2 98%   BMI 35.94 kg/m      Physical Exam  GENERAL: healthy, alert and no distress  EYES: Eyes grossly normal to inspection, PERRL and conjunctivae and sclerae normal  HENT: ear canals and TM's normal, nose and mouth without ulcers or lesions  NECK: no adenopathy, no asymmetry, masses, or scars and thyroid normal to palpation  RESP: lungs clear to auscultation - no rales, rhonchi or wheezes  CV: regular rate and rhythm, normal S1 S2, no S3 or S4, no murmur, click or rub, no peripheral edema and peripheral pulses strong  ABDOMEN: soft, nontender, no hepatosplenomegaly, no masses and bowel sounds normal  MS: no gross musculoskeletal defects noted, no edema  SKIN: no suspicious lesions or rashes  NEURO: Normal strength and tone, mentation intact and speech normal  PSYCH: mentation appears normal, affect normal/bright    Diagnostic Test Results:  Labs reviewed in Epic    ASSESSMENT/PLAN:       ICD-10-CM    1. Physical exam, routine  Z00.00       2. Screening for HIV (human immunodeficiency virus)  Z11.4 HIV Antigen Antibody Combo      3. Need for hepatitis C screening test  Z11.59 Hepatitis C Screen Reflex to HCV RNA Quant and Genotype      4. Screening for hyperlipidemia   Z13.220 Lipid panel reflex to direct LDL Fasting      5. Fatigue, unspecified type  R53.83 Glucose     Testosterone, total                COUNSELING:   Reviewed preventive health counseling, as reflected in patient instructions       Regular exercise       Healthy diet/nutrition        He reports that he has never smoked. He has never used smokeless tobacco.            THERON MACKEY MD  [facility]

## 2023-02-23 ENCOUNTER — LAB (OUTPATIENT)
Dept: LAB | Facility: CLINIC | Age: 38
End: 2023-02-23
Payer: COMMERCIAL

## 2023-02-23 DIAGNOSIS — Z11.4 SCREENING FOR HIV (HUMAN IMMUNODEFICIENCY VIRUS): ICD-10-CM

## 2023-02-23 DIAGNOSIS — Z13.220 SCREENING FOR HYPERLIPIDEMIA: ICD-10-CM

## 2023-02-23 DIAGNOSIS — Z11.59 NEED FOR HEPATITIS C SCREENING TEST: ICD-10-CM

## 2023-02-23 DIAGNOSIS — R53.83 FATIGUE, UNSPECIFIED TYPE: ICD-10-CM

## 2023-02-23 LAB
CHOLEST SERPL-MCNC: 200 MG/DL
FASTING STATUS PATIENT QL REPORTED: YES
GLUCOSE SERPL-MCNC: 96 MG/DL (ref 70–99)
HDLC SERPL-MCNC: 51 MG/DL
LDLC SERPL CALC-MCNC: 134 MG/DL
NONHDLC SERPL-MCNC: 149 MG/DL
TRIGL SERPL-MCNC: 76 MG/DL

## 2023-02-23 PROCEDURE — 87389 HIV-1 AG W/HIV-1&-2 AB AG IA: CPT

## 2023-02-23 PROCEDURE — 86803 HEPATITIS C AB TEST: CPT

## 2023-02-23 PROCEDURE — 36415 COLL VENOUS BLD VENIPUNCTURE: CPT

## 2023-02-23 PROCEDURE — 84403 ASSAY OF TOTAL TESTOSTERONE: CPT

## 2023-02-23 PROCEDURE — 80061 LIPID PANEL: CPT

## 2023-02-23 PROCEDURE — 82947 ASSAY GLUCOSE BLOOD QUANT: CPT

## 2023-02-24 LAB
HCV AB SERPL QL IA: NONREACTIVE
HIV 1+2 AB+HIV1 P24 AG SERPL QL IA: NONREACTIVE

## 2023-02-26 LAB — TESTOST SERPL-MCNC: 316 NG/DL (ref 240–950)

## 2023-05-14 ENCOUNTER — HEALTH MAINTENANCE LETTER (OUTPATIENT)
Age: 38
End: 2023-05-14

## 2024-04-08 ENCOUNTER — OFFICE VISIT (OUTPATIENT)
Dept: FAMILY MEDICINE | Facility: CLINIC | Age: 39
End: 2024-04-08
Payer: COMMERCIAL

## 2024-04-08 VITALS
HEART RATE: 62 BPM | DIASTOLIC BLOOD PRESSURE: 81 MMHG | TEMPERATURE: 98 F | OXYGEN SATURATION: 97 % | RESPIRATION RATE: 12 BRPM | WEIGHT: 275.8 LBS | BODY MASS INDEX: 35.39 KG/M2 | SYSTOLIC BLOOD PRESSURE: 132 MMHG | HEIGHT: 74 IN

## 2024-04-08 DIAGNOSIS — Z13.29 SCREENING FOR THYROID DISORDER: ICD-10-CM

## 2024-04-08 DIAGNOSIS — Z13.1 SCREENING FOR DIABETES MELLITUS: ICD-10-CM

## 2024-04-08 DIAGNOSIS — F41.9 ANXIETY AND DEPRESSION: Primary | ICD-10-CM

## 2024-04-08 DIAGNOSIS — Z13.220 SCREENING FOR LIPOID DISORDERS: ICD-10-CM

## 2024-04-08 DIAGNOSIS — F32.A ANXIETY AND DEPRESSION: Primary | ICD-10-CM

## 2024-04-08 DIAGNOSIS — Z00.00 ROUTINE GENERAL MEDICAL EXAMINATION AT A HEALTH CARE FACILITY: ICD-10-CM

## 2024-04-08 PROCEDURE — 90480 ADMN SARSCOV2 VAC 1/ONLY CMP: CPT | Performed by: FAMILY MEDICINE

## 2024-04-08 PROCEDURE — 99395 PREV VISIT EST AGE 18-39: CPT | Mod: 25 | Performed by: FAMILY MEDICINE

## 2024-04-08 PROCEDURE — 91320 SARSCV2 VAC 30MCG TRS-SUC IM: CPT | Performed by: FAMILY MEDICINE

## 2024-04-08 PROCEDURE — 99214 OFFICE O/P EST MOD 30 MIN: CPT | Mod: 25 | Performed by: FAMILY MEDICINE

## 2024-04-08 RX ORDER — ESCITALOPRAM OXALATE 10 MG/1
10 TABLET ORAL DAILY
Qty: 30 TABLET | Refills: 1 | Status: SHIPPED | OUTPATIENT
Start: 2024-04-08 | End: 2024-05-08

## 2024-04-08 SDOH — HEALTH STABILITY: PHYSICAL HEALTH: ON AVERAGE, HOW MANY DAYS PER WEEK DO YOU ENGAGE IN MODERATE TO STRENUOUS EXERCISE (LIKE A BRISK WALK)?: 5 DAYS

## 2024-04-08 ASSESSMENT — ANXIETY QUESTIONNAIRES
8. IF YOU CHECKED OFF ANY PROBLEMS, HOW DIFFICULT HAVE THESE MADE IT FOR YOU TO DO YOUR WORK, TAKE CARE OF THINGS AT HOME, OR GET ALONG WITH OTHER PEOPLE?: EXTREMELY DIFFICULT
GAD7 TOTAL SCORE: 16
1. FEELING NERVOUS, ANXIOUS, OR ON EDGE: NEARLY EVERY DAY
5. BEING SO RESTLESS THAT IT IS HARD TO SIT STILL: MORE THAN HALF THE DAYS
6. BECOMING EASILY ANNOYED OR IRRITABLE: MORE THAN HALF THE DAYS
7. FEELING AFRAID AS IF SOMETHING AWFUL MIGHT HAPPEN: MORE THAN HALF THE DAYS
GAD7 TOTAL SCORE: 16
4. TROUBLE RELAXING: MORE THAN HALF THE DAYS
3. WORRYING TOO MUCH ABOUT DIFFERENT THINGS: NEARLY EVERY DAY
7. FEELING AFRAID AS IF SOMETHING AWFUL MIGHT HAPPEN: MORE THAN HALF THE DAYS
2. NOT BEING ABLE TO STOP OR CONTROL WORRYING: MORE THAN HALF THE DAYS
IF YOU CHECKED OFF ANY PROBLEMS ON THIS QUESTIONNAIRE, HOW DIFFICULT HAVE THESE PROBLEMS MADE IT FOR YOU TO DO YOUR WORK, TAKE CARE OF THINGS AT HOME, OR GET ALONG WITH OTHER PEOPLE: EXTREMELY DIFFICULT
GAD7 TOTAL SCORE: 16

## 2024-04-08 ASSESSMENT — SOCIAL DETERMINANTS OF HEALTH (SDOH): HOW OFTEN DO YOU GET TOGETHER WITH FRIENDS OR RELATIVES?: ONCE A WEEK

## 2024-04-08 ASSESSMENT — ENCOUNTER SYMPTOMS: NERVOUS/ANXIOUS: 1

## 2024-04-08 ASSESSMENT — PATIENT HEALTH QUESTIONNAIRE - PHQ9
SUM OF ALL RESPONSES TO PHQ QUESTIONS 1-9: 12
SUM OF ALL RESPONSES TO PHQ QUESTIONS 1-9: 12
10. IF YOU CHECKED OFF ANY PROBLEMS, HOW DIFFICULT HAVE THESE PROBLEMS MADE IT FOR YOU TO DO YOUR WORK, TAKE CARE OF THINGS AT HOME, OR GET ALONG WITH OTHER PEOPLE: SOMEWHAT DIFFICULT

## 2024-04-08 ASSESSMENT — PAIN SCALES - GENERAL: PAINLEVEL: NO PAIN (0)

## 2024-04-08 NOTE — PROGRESS NOTES
"Preventive Care Visit  [facility] [place]  THERON MACKEY MD, Family Medicine  Apr 8, 2024      Assessment & Plan   Problem List Items Addressed This Visit       Anxiety and depression - Primary    Relevant Medications    escitalopram (LEXAPRO) 10 MG tablet    Other Relevant Orders    Adult Mental Health  Referral    Testosterone Free and Total (Completed)    Basic metabolic panel (Completed)     Other Visit Diagnoses       Screening for lipoid disorders        Relevant Orders    Lipid panel reflex to direct LDL Non-fasting (Completed)    Screening for diabetes mellitus        Relevant Orders    Basic metabolic panel (Completed)    Screening for thyroid disorder        Relevant Orders    TSH with free T4 reflex (Completed)    Routine general medical examination at a health care facility               Patient agreeable to get started on lexapro.   Also agreeable to therapy.   Discussed risk and Side effects of med.     No current thoughts of self harm, will notify us or others if that changes.               BMI  Estimated body mass index is 35.89 kg/m  as calculated from the following:    Height as of this encounter: 1.867 m (6' 1.5\").    Weight as of this encounter: 125.1 kg (275 lb 12.8 oz).       Counseling  Appropriate preventive services were discussed with this patient, including applicable screening as appropriate for fall prevention, nutrition, physical activity, Tobacco-use cessation, weight loss and cognition.  Checklist reviewing preventive services available has been given to the patient.  The patient's PHQ-9 score is consistent with moderate depression. He was provided with information regarding depression.               Depression Screening Follow Up        4/8/2024     2:43 PM   PHQ   PHQ-9 Total Score 12   Q9: Thoughts of better off dead/self-harm past 2 weeks Several days   F/U: Thoughts of suicide or self-harm Yes   F/U: Self harm-plan No   F/U: Self-harm action No "   F/U: Safety concerns Yes         4/8/2024     2:43 PM   Last PHQ-9   1.  Little interest or pleasure in doing things 1   2.  Feeling down, depressed, or hopeless 1   3.  Trouble falling or staying asleep, or sleeping too much 0   4.  Feeling tired or having little energy 2   5.  Poor appetite or overeating 1   6.  Feeling bad about yourself 2   7.  Trouble concentrating 3   8.  Moving slowly or restless 1   Q9: Thoughts of better off dead/self-harm past 2 weeks 1   PHQ-9 Total Score 12   In the past two weeks have you had thoughts of suicide or self harm? Yes   Do you have concerns about your personal safety or the safety of others? Yes   In the past 2 weeks have you thought about a plan or had intention to harm yourself? No   In the past 2 weeks have you acted on these thoughts in any way? No                   Follow Up Actions Taken  Mental Health Referral placed    Discussed the   ways the patient can remain in a safe environment        Kj Benedict is a 38 year old, presenting for the following:  Physical (Annual Physical. ) and Anxiety (PHQ-9 score was 12, NIKI-7 score was 16. Pt states it has been going on for awhile but getting worse over the last few months. Pt states work has been stressful. )        4/8/2024     2:53 PM   Additional Questions   Roomed by Shena MIRZA CMA        Health Care Directive  Patient does not have a Health Care Directive or Living Will: Discussed advance care planning with patient; however, patient declined at this time.    Anxiety                 4/8/2024   General Health   How would you rate your overall physical health? (!) FAIR   Feel stress (tense, anxious, or unable to sleep) To some extent   (!) STRESS CONCERN      4/8/2024   Nutrition   Three or more servings of calcium each day? Yes   Diet: Regular (no restrictions)   How many servings of fruit and vegetables per day? 4 or more   How many sweetened beverages each day? 0-1         4/8/2024   Exercise   Days per week of  "moderate/strenous exercise 5 days         4/8/2024   Social Factors   Frequency of gathering with friends or relatives Once a week   Worry food won't last until get money to buy more No   Food not last or not have enough money for food? No   Do you have housing?  Yes   Are you worried about losing your housing? No   Lack of transportation? No   Unable to get utilities (heat,electricity)? No         4/8/2024   Dental   Dentist two times every year? Yes         4/8/2024   TB Screening   Were you born outside of the [place]? No       Today's PHQ-9 Score:       4/8/2024     2:43 PM   PHQ-9 SCORE   PHQ-9 Total Score MyChart 12 (Moderate depression)   PHQ-9 Total Score 12         4/8/2024   Substance Use   Alcohol more than 3/day or more than 7/wk No   Do you use any other substances recreationally? (!) ALCOHOL    (!) CANNABIS PRODUCTS     Social History     Tobacco Use    Smoking status: Never     Passive exposure: Never    Smokeless tobacco: Never   Vaping Use    Vaping Use: Never used   Substance Use Topics    Alcohol use: Yes     Alcohol/week: 6.7 standard drinks of alcohol    Drug use: No           4/8/2024   STI Screening   New sexual partner(s) since last STI/HIV test? No         4/8/2024   Contraception/Family Planning   Questions about contraception or family planning No        Reviewed and updated as needed this visit by Provider                          Review of Systems  Constitutional, HEENT, cardiovascular, pulmonary, gi and gu systems are negative, except as otherwise noted.     Objective    Exam  /81 (BP Location: Right arm, Patient Position: Sitting, Cuff Size: Adult Regular)   Pulse 62   Temp 98  F (36.7  C) (Oral)   Resp 12   Ht 1.867 m (6' 1.5\")   Wt 125.1 kg (275 lb 12.8 oz)   SpO2 97%   BMI 35.89 kg/m     Estimated body mass index is 35.89 kg/m  as calculated from the following:    Height as of this encounter: 1.867 m (6' 1.5\").    Weight as of this encounter: 125.1 kg (275 lb 12.8 " oz).    Physical Exam  GENERAL: alert and no distress  NECK: no adenopathy, no asymmetry, masses, or scars  RESP: lungs clear to auscultation - no rales, rhonchi or wheezes  CV: regular rate and rhythm, normal S1 S2, no S3 or S4, no murmur, click or rub, no peripheral edema  ABDOMEN: soft, nontender, no hepatosplenomegaly, no masses and bowel sounds normal  MS: no gross musculoskeletal defects noted, no edema        Signed Electronically by: THERON MACKEY MD    Answers submitted by the patient for this visit:  Patient Health Questionnaire (Submitted on 4/8/2024)  If you checked off any problems, how difficult have these problems made it for you to do your work, take care of things at home, or get along with other people?: Somewhat difficult  PHQ9 TOTAL SCORE: 12  NIKI-7 (Submitted on 4/8/2024)  NIKI 7 TOTAL SCORE: 16

## 2024-04-11 ENCOUNTER — LAB (OUTPATIENT)
Dept: LAB | Facility: CLINIC | Age: 39
End: 2024-04-11
Payer: COMMERCIAL

## 2024-04-11 DIAGNOSIS — Z13.1 SCREENING FOR DIABETES MELLITUS: ICD-10-CM

## 2024-04-11 DIAGNOSIS — Z13.29 SCREENING FOR THYROID DISORDER: ICD-10-CM

## 2024-04-11 DIAGNOSIS — Z13.220 SCREENING FOR LIPOID DISORDERS: ICD-10-CM

## 2024-04-11 DIAGNOSIS — F41.9 ANXIETY AND DEPRESSION: ICD-10-CM

## 2024-04-11 DIAGNOSIS — F32.A ANXIETY AND DEPRESSION: ICD-10-CM

## 2024-04-11 LAB
ANION GAP SERPL CALCULATED.3IONS-SCNC: 10 MMOL/L (ref 7–15)
BUN SERPL-MCNC: 21.7 MG/DL (ref 6–20)
CALCIUM SERPL-MCNC: 9.8 MG/DL (ref 8.6–10)
CHLORIDE SERPL-SCNC: 104 MMOL/L (ref 98–107)
CHOLEST SERPL-MCNC: 186 MG/DL
CREAT SERPL-MCNC: 1.37 MG/DL (ref 0.67–1.17)
DEPRECATED HCO3 PLAS-SCNC: 27 MMOL/L (ref 22–29)
EGFRCR SERPLBLD CKD-EPI 2021: 69 ML/MIN/1.73M2
FASTING STATUS PATIENT QL REPORTED: YES
GLUCOSE SERPL-MCNC: 98 MG/DL (ref 70–99)
HDLC SERPL-MCNC: 47 MG/DL
LDLC SERPL CALC-MCNC: 120 MG/DL
NONHDLC SERPL-MCNC: 139 MG/DL
POTASSIUM SERPL-SCNC: 4.6 MMOL/L (ref 3.4–5.3)
SHBG SERPL-SCNC: 19 NMOL/L (ref 11–80)
SODIUM SERPL-SCNC: 141 MMOL/L (ref 135–145)
TRIGL SERPL-MCNC: 97 MG/DL
TSH SERPL DL<=0.005 MIU/L-ACNC: 2.31 UIU/ML (ref 0.3–4.2)

## 2024-04-11 PROCEDURE — 84270 ASSAY OF SEX HORMONE GLOBUL: CPT

## 2024-04-11 PROCEDURE — 84443 ASSAY THYROID STIM HORMONE: CPT

## 2024-04-11 PROCEDURE — 80048 BASIC METABOLIC PNL TOTAL CA: CPT

## 2024-04-11 PROCEDURE — 84403 ASSAY OF TOTAL TESTOSTERONE: CPT

## 2024-04-11 PROCEDURE — 36415 COLL VENOUS BLD VENIPUNCTURE: CPT

## 2024-04-11 PROCEDURE — 80061 LIPID PANEL: CPT

## 2024-04-14 LAB
TESTOST FREE SERPL-MCNC: 8.3 NG/DL
TESTOST SERPL-MCNC: 317 NG/DL (ref 240–950)

## 2024-04-19 NOTE — PATIENT INSTRUCTIONS
Preventive Care Advice   This is general advice given by our system to help you stay healthy. However, your care team may have specific advice just for you. Please talk to your care team about your preventive care needs.  Nutrition  Eat 5 or more servings of fruits and vegetables each day.  Try wheat bread, brown rice and whole grain pasta (instead of white bread, rice, and pasta).  Get enough calcium and vitamin D. Check the label on foods and aim for 100% of the RDA (recommended daily allowance).  Lifestyle  Exercise at least 150 minutes each week   (30 minutes a day, 5 days a week).  Do muscle strengthening activities 2 days a week. These help control your weight and prevent disease.  No smoking.  Wear sunscreen to prevent skin cancer.  Have a dental exam and cleaning every 6 months.  Yearly exams  See your health care team every year to talk about:  Any changes in your health.  Any medicines your care team has prescribed.  Preventive care, family planning, and ways to prevent chronic diseases.  Shots (vaccines)   HPV shots (up to age 26), if you've never had them before.  Hepatitis B shots (up to age 59), if you've never had them before.  COVID-19 shot: Get this shot when it's due.  Flu shot: Get a flu shot every year.  Tetanus shot: Get a tetanus shot every 10 years.  Pneumococcal, hepatitis A, and RSV shots: Ask your care team if you need these based on your risk.  Shingles shot (for age 50 and up).  General health tests  Diabetes screening:  Starting at age 35, Get screened for diabetes at least every 3 years.  If you are younger than age 35, ask your care team if you should be screened for diabetes.  Cholesterol test: At age 39, start having a cholesterol test every 5 years, or more often if advised.  Bone density scan (DEXA): At age 50, ask your care team if you should have this scan for osteoporosis (brittle bones).  Hepatitis C: Get tested at least once in your life.  STIs (sexually transmitted  infections)  Before age 24: Ask your care team if you should be screened for STIs.  After age 24: Get screened for STIs if you're at risk. You are at risk for STIs (including HIV) if:  You are sexually active with more than one person.  You don't use condoms every time.  You or a partner was diagnosed with a sexually transmitted infection.  If you are at risk for HIV, ask about PrEP medicine to prevent HIV.  Get tested for HIV at least once in your life, whether you are at risk for HIV or not.  Cancer screening tests  Cervical cancer screening: If you have a cervix, begin getting regular cervical cancer screening tests at age 21. Most people who have regular screenings with normal results can stop after age 65. Talk about this with your provider.  Breast cancer scan (mammogram): If you've ever had breasts, begin having regular mammograms starting at age 40. This is a scan to check for breast cancer.  Colon cancer screening: It is important to start screening for colon cancer at age 45.  Have a colonoscopy test every 10 years (or more often if you're at risk) Or, ask your provider about stool tests like a FIT test every year or Cologuard test every 3 years.  To learn more about your testing options, visit: https://www.[employer]/633467.pdf.  For help making a decision, visit: https://bit.ly/uj13257.  Prostate cancer screening test: If you have a prostate and are age 55 to 69, ask your provider if you would benefit from a yearly prostate cancer screening test.  Lung cancer screening: If you are a current or former smoker age 50 to 80, ask your care team if ongoing lung cancer screenings are right for you.  For informational purposes only. Not to replace the advice of your health care provider. Copyright   2023 [employer]. All rights reserved. Clinically reviewed by the [facility]. [employer] 436072 - REV 01/24.    Learning About Depression Screening  What is depression  screening?  Depression screening is a way to see if you have depression symptoms. It may be done by a doctor or counselor. It's often part of a routine checkup. That's because your mental health is just as important as your physical health.  Depression is a mental health condition that affects how you feel, think, and act. You may:  Have less energy.  Lose interest in your daily activities.  Feel sad and grouchy for a long time.  Depression is very common. It affects people of all ages.  Many things can lead to depression. Some people become depressed after they have a stroke or find out they have a major illness like cancer or heart disease. The death of a loved one or a breakup may lead to depression. It can run in families. Most experts believe that a combination of inherited genes and stressful life events can cause it.  What happens during screening?  You may be asked to fill out a form about your depression symptoms. You and the doctor will discuss your answers. The doctor may ask you more questions to learn more about how you think, act, and feel.  What happens after screening?  If you have symptoms of depression, your doctor will talk to you about your options.  Doctors usually treat depression with medicines or counseling. Often, combining the two works best. Many people don't get help because they think that they'll get over the depression on their own. But people with depression may not get better unless they get treatment.  The cause of depression is not well understood. There may be many factors involved. But if you have depression, it's not your fault.  A serious symptom of depression is thinking about death or suicide. If you or someone you care about talks about this or about feeling hopeless, get help right away.  It's important to know that depression can be treated. Medicine, counseling, and self-care may help.  Where can you learn more?  Go to https://www.healthwise.net/patiented  Enter T185 in  "the search box to learn more about \"Learning About Depression Screening.\"  Current as of: June 24, 2023               Content Version: 14.0    8576-3781 [employer].   Care instructions adapted under license by your healthcare professional. If you have questions about a medical condition or this instruction, always ask your healthcare professional. [employer] disclaims any warranty or liability for your use of this information.      "

## 2024-05-08 ENCOUNTER — OFFICE VISIT (OUTPATIENT)
Dept: FAMILY MEDICINE | Facility: CLINIC | Age: 39
End: 2024-05-08
Payer: COMMERCIAL

## 2024-05-08 VITALS
DIASTOLIC BLOOD PRESSURE: 77 MMHG | HEART RATE: 61 BPM | TEMPERATURE: 98.2 F | RESPIRATION RATE: 16 BRPM | WEIGHT: 273 LBS | HEIGHT: 72 IN | SYSTOLIC BLOOD PRESSURE: 126 MMHG | BODY MASS INDEX: 36.98 KG/M2 | OXYGEN SATURATION: 99 %

## 2024-05-08 DIAGNOSIS — F32.A ANXIETY AND DEPRESSION: Primary | ICD-10-CM

## 2024-05-08 DIAGNOSIS — M72.2 PLANTAR FASCIITIS: ICD-10-CM

## 2024-05-08 DIAGNOSIS — F41.9 ANXIETY AND DEPRESSION: Primary | ICD-10-CM

## 2024-05-08 PROCEDURE — 99213 OFFICE O/P EST LOW 20 MIN: CPT | Performed by: FAMILY MEDICINE

## 2024-05-08 RX ORDER — ESCITALOPRAM OXALATE 20 MG/1
20 TABLET ORAL DAILY
Qty: 30 TABLET | Refills: 2 | Status: SHIPPED | OUTPATIENT
Start: 2024-05-08 | End: 2024-08-13

## 2024-05-08 ASSESSMENT — ANXIETY QUESTIONNAIRES
8. IF YOU CHECKED OFF ANY PROBLEMS, HOW DIFFICULT HAVE THESE MADE IT FOR YOU TO DO YOUR WORK, TAKE CARE OF THINGS AT HOME, OR GET ALONG WITH OTHER PEOPLE?: SOMEWHAT DIFFICULT
5. BEING SO RESTLESS THAT IT IS HARD TO SIT STILL: NOT AT ALL
3. WORRYING TOO MUCH ABOUT DIFFERENT THINGS: SEVERAL DAYS
2. NOT BEING ABLE TO STOP OR CONTROL WORRYING: NOT AT ALL
IF YOU CHECKED OFF ANY PROBLEMS ON THIS QUESTIONNAIRE, HOW DIFFICULT HAVE THESE PROBLEMS MADE IT FOR YOU TO DO YOUR WORK, TAKE CARE OF THINGS AT HOME, OR GET ALONG WITH OTHER PEOPLE: SOMEWHAT DIFFICULT
1. FEELING NERVOUS, ANXIOUS, OR ON EDGE: SEVERAL DAYS
4. TROUBLE RELAXING: NOT AT ALL
GAD7 TOTAL SCORE: 3
7. FEELING AFRAID AS IF SOMETHING AWFUL MIGHT HAPPEN: NOT AT ALL
GAD7 TOTAL SCORE: 3
7. FEELING AFRAID AS IF SOMETHING AWFUL MIGHT HAPPEN: NOT AT ALL
6. BECOMING EASILY ANNOYED OR IRRITABLE: SEVERAL DAYS

## 2024-05-08 ASSESSMENT — PAIN SCALES - GENERAL: PAINLEVEL: NO PAIN (0)

## 2024-05-08 NOTE — PROGRESS NOTES
"  Assessment & Plan   Problem List Items Addressed This Visit       Anxiety and depression - Primary    Relevant Medications    escitalopram (LEXAPRO) 20 MG tablet     Other Visit Diagnoses       Plantar fasciitis                      BMI  Estimated body mass index is 36.62 kg/m  as calculated from the following:    Height as of this encounter: 1.839 m (6' 0.4\").    Weight as of this encounter: 123.8 kg (273 lb).             Kj Benedict is a 38 year old, presenting for the following health issues:  Recheck Medication (Follow up test results. New medication seems to be going well per pt. Poor circulation to feet, sore feet-worse in morning. )        5/8/2024     2:27 PM   Additional Questions   Roomed by Stephanie NEWELL LPN     History of Present Illness       Mental Health Follow-up:  Patient presents to follow-up on Depression & Anxiety.Patient's depression since last visit has been:  Better  The patient is not having other symptoms associated with depression.  Patient's anxiety since last visit has been:  Better  The patient is not having other symptoms associated with anxiety.  Any significant life events: job concerns and financial concerns  Patient is not feeling anxious or having panic attacks.  Patient has no concerns about alcohol or drug use.    Hyperlipidemia:  He presents for follow up of hyperlipidemia.   He is not taking medication to lower cholesterol. He is not having myalgia or other side effects to statin medications.    He eats 4 or more servings of fruits and vegetables daily.He consumes 0 sweetened beverage(s) daily.He exercises with enough effort to increase his heart rate 30 to 60 minutes per day.  He exercises with enough effort to increase his heart rate 5 days per week.   He is taking medications regularly.                     Objective    /77   Pulse 61   Temp 98.2  F (36.8  C) (Oral)   Resp 16   Ht 1.839 m (6' 0.4\")   Wt 123.8 kg (273 lb)   SpO2 99%   BMI 36.62 kg/m    Body " mass index is 36.62 kg/m .  Physical Exam   GENERAL: alert and no distress  NECK: no adenopathy, no asymmetry, masses, or scars  RESP: lungs clear to auscultation - no rales, rhonchi or wheezes  CV: regular rate and rhythm, normal S1 S2, no S3 or S4, no murmur, click or rub, no peripheral edema  ABDOMEN: soft, nontender, no hepatosplenomegaly, no masses and bowel sounds normal  MS: no gross musculoskeletal defects noted, no edema  FEET: tenderness to the inferomedial aspect of the effected heel(s)at the insertion of the plantar fascia.              Signed Electronically by: THERON MACKEY MD

## 2024-08-13 DIAGNOSIS — F32.A ANXIETY AND DEPRESSION: ICD-10-CM

## 2024-08-13 DIAGNOSIS — F41.9 ANXIETY AND DEPRESSION: ICD-10-CM

## 2024-08-13 RX ORDER — ESCITALOPRAM OXALATE 20 MG/1
20 TABLET ORAL DAILY
Qty: 30 TABLET | Refills: 2 | Status: SHIPPED | OUTPATIENT
Start: 2024-08-13

## 2024-09-03 NOTE — PATIENT INSTRUCTIONS
Preventive Health Recommendations  Male Ages 26 - 39    Yearly exam:             See your health care provider every year in order to  o   Review health changes.   o   Discuss preventive care.    o   Review your medicines if your doctor has prescribed any.    You should be tested each year for STDs (sexually transmitted diseases), if you re at risk.     After age 35, talk to your provider about cholesterol testing. If you are at risk for heart disease, have your cholesterol tested at least every 5 years.     If you are at risk for diabetes, you should have a diabetes test (fasting glucose).  Shots: Get a flu shot each year. Get a tetanus shot every 10 years.     Nutrition:    Eat at least 5 servings of fruits and vegetables daily.     Eat whole-grain bread, whole-wheat pasta and brown rice instead of white grains and rice.     Get adequate Calcium and Vitamin D.     Lifestyle    Exercise for at least 150 minutes a week (30 minutes a day, 5 days a week). This will help you control your weight and prevent disease.     Limit alcohol to one drink per day.     No smoking.     Wear sunscreen to prevent skin cancer.     See your dentist every six months for an exam and cleaning.      (1) Other Diagnosis

## 2024-09-05 ENCOUNTER — ANCILLARY PROCEDURE (OUTPATIENT)
Dept: GENERAL RADIOLOGY | Facility: CLINIC | Age: 39
End: 2024-09-05
Attending: FAMILY MEDICINE
Payer: COMMERCIAL

## 2024-09-05 ENCOUNTER — MYC MEDICAL ADVICE (OUTPATIENT)
Dept: FAMILY MEDICINE | Facility: CLINIC | Age: 39
End: 2024-09-05
Payer: COMMERCIAL

## 2024-09-05 ENCOUNTER — OFFICE VISIT (OUTPATIENT)
Dept: FAMILY MEDICINE | Facility: CLINIC | Age: 39
End: 2024-09-05
Payer: COMMERCIAL

## 2024-09-05 VITALS
SYSTOLIC BLOOD PRESSURE: 120 MMHG | DIASTOLIC BLOOD PRESSURE: 76 MMHG | OXYGEN SATURATION: 99 % | RESPIRATION RATE: 14 BRPM | HEART RATE: 60 BPM | BODY MASS INDEX: 36.89 KG/M2 | WEIGHT: 275 LBS

## 2024-09-05 DIAGNOSIS — M25.511 RIGHT SHOULDER PAIN, UNSPECIFIED CHRONICITY: ICD-10-CM

## 2024-09-05 DIAGNOSIS — M25.511 RIGHT SHOULDER PAIN, UNSPECIFIED CHRONICITY: Primary | ICD-10-CM

## 2024-09-05 PROCEDURE — 99214 OFFICE O/P EST MOD 30 MIN: CPT | Performed by: FAMILY MEDICINE

## 2024-09-05 PROCEDURE — 73030 X-RAY EXAM OF SHOULDER: CPT | Mod: TC | Performed by: RADIOLOGY

## 2024-09-05 NOTE — PATIENT INSTRUCTIONS
I think you have a deltoid muscle injury.  Xray is negative for AC joint injury and shoulder bone/arm bone fractures.    I sent you to sports medicine for further evaluation.

## 2024-09-05 NOTE — PROGRESS NOTES
Assessment & Plan     Right shoulder pain, unspecified chronicity  Sent to sports med for eval of shoulder. I don't think rotator cuff but will leave to sport med to evaluation  ?deltoid injury  - XR Shoulder Right G/E 3 Views  - Orthopedic  Referral             No follow-ups on file.    Sathish Trivedi MD  [facility]    Kj Benedict is a 39 year old male who presents to clinic today for the following health issues:  Chief Complaint   Patient presents with    Shoulder Pain     Injured right shoulder about 5 weeks ago. Patient was playing sofball landed wrong.        HPI    Right shoulder pain  Dove for ball and landed wrong  Pain in shoulder and in front  No numbness or tingling.  No ice   No medicine.  Bothers it with overhead and back.  Not able to work out.          Review of Systems        Objective    /76   Pulse 60   Resp 14   Wt 124.7 kg (275 lb)   SpO2 99%   BMI 36.89 kg/m    Physical Exam  Vitals and nursing note reviewed.   Constitutional:       Appearance: Normal appearance.   Musculoskeletal:      Comments: Some decreased ROM with abduction  Neg scarf  Min pain with internal/ext rotation of shoulder against resistance  Empty can with pain on right  Deltoid tender at insertion  No AC pain/swelling   Neurological:      Mental Status: He is alert.

## 2024-09-14 ENCOUNTER — OFFICE VISIT (OUTPATIENT)
Dept: ORTHOPEDICS | Facility: CLINIC | Age: 39
End: 2024-09-14
Attending: FAMILY MEDICINE
Payer: COMMERCIAL

## 2024-09-14 VITALS — DIASTOLIC BLOOD PRESSURE: 72 MMHG | SYSTOLIC BLOOD PRESSURE: 118 MMHG

## 2024-09-14 DIAGNOSIS — M75.41 SHOULDER IMPINGEMENT SYNDROME, RIGHT: ICD-10-CM

## 2024-09-14 DIAGNOSIS — S49.91XA INJURY OF RIGHT SHOULDER, INITIAL ENCOUNTER: Primary | ICD-10-CM

## 2024-09-14 PROCEDURE — 99214 OFFICE O/P EST MOD 30 MIN: CPT | Performed by: PHYSICIAN ASSISTANT

## 2024-09-14 NOTE — LETTER
9/14/2024      Jak Ortega  [address] [address]      Dear Colleague,    Thank you for referring your patient, Jak Ortega, to the [facility]. Please see a copy of my visit note below.    ASSESSMENT & PLAN       Today we discussed the underlying etiology/pathology of patient's   1. Injury of right shoulder, initial encounter    2. Shoulder impingement syndrome, right      Discussed diagnosis and treatment options with the patient today. A shared decision making model was used. The patient's values and choices were respected. The following represents what was discussed and decided upon by the provider and the patient.   -We discussed the patient sustained an acute injury to his right shoulder while playing softball 5 weeks ago.  Patient was playing left field and dove for a ball putting his arms out in front of him and hit the ground.  Patient was able to complete the rest of the game as well as play a subsequent double header game but did have right shoulder discomfort with throwing activities.  - We discussed the patient has almost symmetric range of motion of both shoulders with slight pain around the deltoid region with terminal flexion and abduction motions.  His rotator cuff tone is within normal limits with no pain to palpation throughout the entire right shoulder girdle.  - I suspect the patient has rotator cuff impingement/tendinosis due to the injury.  - X-rays reviewed of the shoulder which are unremarkable for bony abnormality with maintained joint spaces.  - We discussed the patient has not tried any conservative treatment since injury, has continued with normal daily activities at work as well as recreational heavy weight lifting.  Symptoms are aggravated by pull-ups as well as bench press.  - We discussed various treatment options including benign neglect, use of anti-inflammatory medication, referral to physical therapy or  trying home exercise program, consideration of corticosteroid injection or advanced imaging.  - We agreed at this time we would proceed conservatively.  Patient will start diclofenac 50 mg tablet to be taken 1 tablet every 12 hours for the next 3 weeks.  While taking this medicine he needs to avoid all other NSAIDs including ibuprofen, Aleve and Advil.  - Patient may supplement up to 3000 mg daily with acetaminophen/Tylenol with his prescription for additional pain control if needed.  Patient should use topical ice to the shoulder to help decrease inflammation.  - We discussed modifying recreational activities including his weightlifting program.  He should avoid all activities above shoulder height they are not mandatory for his employment until repeat assessment to allow the rotator cuff to recover.  Patient will start his home exercise program as he does have a home gym and Thera-Band's available.  - If patient is not better over the next 3-4 weeks with treatment plan he should come back to the office for repeat assessment and discussion about proceeding with advanced imaging.    -Call direct clinic number [[phone]] at any time with questions or concerns in regards to your recent office visit with me.     Willard John PA-C  [facility]    This note was completed in part using a voice recognition software, any grammatical or context distortion are unintentional and inherent to the software.         SUBJECTIVE  Jak Ortega is a/an 39 year old right-handed male who is seen in consultation at the request of  Sathish Trivedi M.D. for evaluation of right shoulder pain. The patient is seen by themselves.    Onset: 6 week(s) ago. Patient describes injury as was playing softball, landed wrong while diving for a fly ball with both arms out in front of him  Location of Pain: right anterior shoulder, also deltoid mass.   Rating of Pain at worst: 9/10  Rating of Pain Currently:  2/10  Worsened by: overhead, movements, throwing a softball, bench press.  Better with: nothing  Treatments tried: no treatment tried to date  Quality: throbbing, sharp, stabbing  Associated symptoms: weakness of RC muscles and feeling of instability  Orthopedic history: NO  Relevant surgical history: NO  Social history: social history: works at construction company. Enjoys playing softball.     No past medical history on file.  Social History     Socioeconomic History     Marital status:      Number of children: 2   Tobacco Use     Smoking status: Never     Passive exposure: Never     Smokeless tobacco: Never   Vaping Use     Vaping status: Never Used   Substance and Sexual Activity     Alcohol use: Yes     Alcohol/week: 6.7 standard drinks of alcohol     Drug use: No     Sexual activity: Yes     Partners: Female     Social Determinants of Health     Financial Resource Strain: Low Risk  (4/8/2024)    Financial Resource Strain      Within the past 12 months, have you or your family members you live with been unable to get utilities (heat, electricity) when it was really needed?: No   Food Insecurity: Low Risk  (4/8/2024)    Food Insecurity      Within the past 12 months, did you worry that your food would run out before you got money to buy more?: No      Within the past 12 months, did the food you bought just not last and you didn t have money to get more?: No   Transportation Needs: Low Risk  (4/8/2024)    Transportation Needs      Within the past 12 months, has lack of transportation kept you from medical appointments, getting your medicines, non-medical meetings or appointments, work, or from getting things that you need?: No   Physical Activity: Unknown (4/8/2024)    Exercise Vital Sign      Days of Exercise per Week: 5 days   Stress: Stress Concern Present (4/8/2024)    [employer] - Occupational Stress Questionnaire      Feeling of Stress : To some extent   Social  Connections: Unknown (4/8/2024)    Social Connection and Isolation Panel [NHANES]      Frequency of Social Gatherings with Friends and Family: Once a week   Interpersonal Safety: Low Risk  (4/8/2024)    Interpersonal Safety      Do you feel physically and emotionally safe where you currently live?: Yes      Within the past 12 months, have you been hit, slapped, kicked or otherwise physically hurt by someone?: No      Within the past 12 months, have you been humiliated or emotionally abused in other ways by your partner or ex-partner?: No   Housing Stability: Low Risk  (4/8/2024)    Housing Stability      Do you have housing? : Yes      Are you worried about losing your housing?: No         Patient's past medical, surgical, social, and family histories were personally reviewed today and no changes are noted.    REVIEW OF SYSTEMS:  10 point ROS is negative other than symptoms noted above in HPI, Past Medical History or as stated below  Constitutional: NEGATIVE for fever, chills, change in weight  Skin: NEGATIVE for worrisome rashes, moles or lesions  GI/: NEGATIVE for bowel or bladder changes  Neuro: NEGATIVE for weakness, dizziness or paresthesias    OBJECTIVE:  Vital signs as noted in EPIC for 9/14/2024  General: healthy, alert and in no distress  HEENT: no scleral icterus or conjunctival erythema  Skin: no suspicious lesions or rash. No jaundice.  CV: no pedal edema  Resp: normal respiratory effort without conversational dyspnea   Psych: normal mood and affect  Gait: normal steady gait with appropriate coordination and balance  Neuro: Normal light sensory exam of lower extremity      MSK:  Exam shows a very muscular 39-year-old male who ambulates weightbearing without assistive device.  He is alert and oriented x 3.  With short removed patient shows no asymmetry.  No bruising, swelling or ecchymosis.  Patient has discomfort of the right shoulder with terminal flexion above shoulder height lacking approximately 10  degrees of terminal flexion compared to contralateral shoulder which again is uncomfortable and he describes pain more over the top of the shoulder deltoid region.  Abduction of the left shoulder is above 130 degrees without pain.  Right symptomatic shoulder gets to about 90 degrees with pain again in the proximal right shoulder deltoid region.  No pain to palpation throughout the cervical spine, trapezius musculature, scapular body or surrounding tissues, no pain at the sternum, SC joint, clavicle or AC joint.  AC joint loading test is negative.  No pain to palpation throughout the entire deltoid including deltoid tuberosity.  No pain in the anterior subacromial space or bicipital groove.  No biceps or tricep deformity.  No pain along the anterior or posterior joint line.  No pain along the pectoralis major or minor region.  No pain in the proximal humeral shaft.  Patient has 5 out of 5 motor tone with internal and external rotators with increased shoulder discomfort with testing of the external rotator.  [place]'s and speeds test are largely unremarkable.  Patient does have discomfort with impingement testing on the right shoulder.  Empty can testing generates shoulder discomfort but no weakness.  With the arm abducted to 90 degrees for impingement testing patient has increased pain with resisted external rotation.  Liftoff test negative.  Sulcus sign is negative.  Negative apprehension sign.  Patient is neurovascular intact through both upper extremities with +2 radial pulses.  Full range of motion of the elbow wrist and fingers.  No pain with range of motion of the C-spine.            Personal Independent visualization of the below images done today:  Previous x-rays of the right shoulder personally reviewed and reviewed with the patient.  Normal exam    Narrative & Impression  EXAM: XR SHOULDER RIGHT G/E 3 VIEWS  LOCATION: [facility]  DATE: 9/5/2024     INDICATION:  Right shoulder pain,  unspecified chronicity  COMPARISON: None.                                                                      IMPRESSION: Normal joint spaces and alignment. No fracture.    Patient's conditions were thoroughly discussed during today's visit with total time reviewing patient's previous medical records/history/radiology, face-to-face examination and discussion and plan of care with the patient and documentation being 30 minutes for today's clinical visit  Willard John PA-C  [facility]    This note was completed in part using a voice recognition software, any grammatical or context distortion are unintentional and inherent to the software.       Again, thank you for allowing me to participate in the care of your patient.        Sincerely,        Willard John PA-C

## 2024-09-14 NOTE — PATIENT INSTRUCTIONS
Today we discussed the underlying etiology/pathology of patient's   1. Injury of right shoulder, initial encounter    2. Shoulder impingement syndrome, right      Discussed diagnosis and treatment options with the patient today. A shared decision making model was used. The patient's values and choices were respected. The following represents what was discussed and decided upon by the provider and the patient.   -We discussed the patient sustained an acute injury to his right shoulder while playing softball 5 weeks ago.  Patient was playing left field and dove for a ball putting his arms out in front of him and hit the ground.  Patient was able to complete the rest of the game as well as play a subsequent double header game but did have right shoulder discomfort with throwing activities.  - We discussed the patient has almost symmetric range of motion of both shoulders with slight pain around the deltoid region with terminal flexion and abduction motions.  His rotator cuff tone is within normal limits with no pain to palpation throughout the entire right shoulder girdle.  - I suspect the patient has rotator cuff impingement/tendinosis due to the injury.  - X-rays reviewed of the shoulder which are unremarkable for bony abnormality with maintained joint spaces.  - We discussed the patient has not tried any conservative treatment since injury, has continued with normal daily activities at work as well as recreational heavy weight lifting.  Symptoms are aggravated by pull-ups as well as bench press.  - We discussed various treatment options including benign neglect, use of anti-inflammatory medication, referral to physical therapy or trying home exercise program, consideration of corticosteroid injection or advanced imaging.  - We agreed at this time we would proceed conservatively.  Patient will start diclofenac 50 mg tablet to be taken 1 tablet every 12 hours for the next 3 weeks.  While taking this medicine he  needs to avoid all other NSAIDs including ibuprofen, Aleve and Advil.  - Patient may supplement up to 3000 mg daily with acetaminophen/Tylenol with his prescription for additional pain control if needed.  Patient should use topical ice to the shoulder to help decrease inflammation.  - We discussed modifying recreational activities including his weightlifting program.  He should avoid all activities above shoulder height they are not mandatory for his employment until repeat assessment to allow the rotator cuff to recover.  Patient will start his home exercise program as he does have a home gym and Thera-Band's available.  - If patient is not better over the next 3-4 weeks with treatment plan he should come back to the office for repeat assessment and discussion about proceeding with advanced imaging.    -Call direct clinic number [[phone]] at any time with questions or concerns in regards to your recent office visit with me.     Willard John PA-C  [facility]    This note was completed in part using a voice recognition software, any grammatical or context distortion are unintentional and inherent to the software.

## 2024-09-14 NOTE — PROGRESS NOTES
ASSESSMENT & PLAN       Today we discussed the underlying etiology/pathology of patient's   1. Injury of right shoulder, initial encounter    2. Shoulder impingement syndrome, right      Discussed diagnosis and treatment options with the patient today. A shared decision making model was used. The patient's values and choices were respected. The following represents what was discussed and decided upon by the provider and the patient.   -We discussed the patient sustained an acute injury to his right shoulder while playing softball 5 weeks ago.  Patient was playing left field and dove for a ball putting his arms out in front of him and hit the ground.  Patient was able to complete the rest of the game as well as play a subsequent double header game but did have right shoulder discomfort with throwing activities.  - We discussed the patient has almost symmetric range of motion of both shoulders with slight pain around the deltoid region with terminal flexion and abduction motions.  His rotator cuff tone is within normal limits with no pain to palpation throughout the entire right shoulder girdle.  - I suspect the patient has rotator cuff impingement/tendinosis due to the injury.  - X-rays reviewed of the shoulder which are unremarkable for bony abnormality with maintained joint spaces.  - We discussed the patient has not tried any conservative treatment since injury, has continued with normal daily activities at work as well as recreational heavy weight lifting.  Symptoms are aggravated by pull-ups as well as bench press.  - We discussed various treatment options including benign neglect, use of anti-inflammatory medication, referral to physical therapy or trying home exercise program, consideration of corticosteroid injection or advanced imaging.  - We agreed at this time we would proceed conservatively.  Patient will start diclofenac 50 mg tablet to be taken 1 tablet every 12 hours for the next 3 weeks.  While taking  this medicine he needs to avoid all other NSAIDs including ibuprofen, Aleve and Advil.  - Patient may supplement up to 3000 mg daily with acetaminophen/Tylenol with his prescription for additional pain control if needed.  Patient should use topical ice to the shoulder to help decrease inflammation.  - We discussed modifying recreational activities including his weightlifting program.  He should avoid all activities above shoulder height they are not mandatory for his employment until repeat assessment to allow the rotator cuff to recover.  Patient will start his home exercise program as he does have a home gym and [employer]'s available.  - If patient is not better over the next 3-4 weeks with treatment plan he should come back to the office for repeat assessment and discussion about proceeding with advanced imaging.    -Call direct clinic number [[phone]] at any time with questions or concerns in regards to your recent office visit with me.     Willard John PA-C  [facility]    This note was completed in part using a voice recognition software, any grammatical or context distortion are unintentional and inherent to the software.         SUBJECTIVE  Jak Ortega is a/an 39 year old right-handed male who is seen in consultation at the request of  Sathish Trivedi M.D. for evaluation of right shoulder pain. The patient is seen by themselves.    Onset: 6 week(s) ago. Patient describes injury as was playing softball, landed wrong while diving for a fly ball with both arms out in front of him  Location of Pain: right anterior shoulder, also deltoid mass.   Rating of Pain at worst: 9/10  Rating of Pain Currently: 2/10  Worsened by: overhead, movements, throwing a softball, bench press.  Better with: nothing  Treatments tried: no treatment tried to date  Quality: throbbing, sharp, stabbing  Associated symptoms: weakness of RC muscles and feeling of instability  Orthopedic history:  NO  Relevant surgical history: NO  Social history: social history: works at construction company. Enjoys playing softball.     No past medical history on file.  Social History     Socioeconomic History    Marital status:     Number of children: 2   Tobacco Use    Smoking status: Never     Passive exposure: Never    Smokeless tobacco: Never   Vaping Use    Vaping status: Never Used   Substance and Sexual Activity    Alcohol use: Yes     Alcohol/week: 6.7 standard drinks of alcohol    Drug use: No    Sexual activity: Yes     Partners: Female     Social Determinants of Health     Financial Resource Strain: Low Risk  (4/8/2024)    Financial Resource Strain     Within the past 12 months, have you or your family members you live with been unable to get utilities (heat, electricity) when it was really needed?: No   Food Insecurity: Low Risk  (4/8/2024)    Food Insecurity     Within the past 12 months, did you worry that your food would run out before you got money to buy more?: No     Within the past 12 months, did the food you bought just not last and you didn t have money to get more?: No   Transportation Needs: Low Risk  (4/8/2024)    Transportation Needs     Within the past 12 months, has lack of transportation kept you from medical appointments, getting your medicines, non-medical meetings or appointments, work, or from getting things that you need?: No   Physical Activity: Unknown (4/8/2024)    Exercise Vital Sign     Days of Exercise per Week: 5 days   Stress: Stress Concern Present (4/8/2024)    [employer] [place] [employer] - Occupational Stress Questionnaire     Feeling of Stress : To some extent   Social Connections: Unknown (4/8/2024)    Social Connection and Isolation Panel [NHANES]     Frequency of Social Gatherings with Friends and Family: Once a week   Interpersonal Safety: Low Risk  (4/8/2024)    Interpersonal Safety     Do you feel physically and emotionally safe where you currently  live?: Yes     Within the past 12 months, have you been hit, slapped, kicked or otherwise physically hurt by someone?: No     Within the past 12 months, have you been humiliated or emotionally abused in other ways by your partner or ex-partner?: No   Housing Stability: Low Risk  (4/8/2024)    Housing Stability     Do you have housing? : Yes     Are you worried about losing your housing?: No         Patient's past medical, surgical, social, and family histories were personally reviewed today and no changes are noted.    REVIEW OF SYSTEMS:  10 point ROS is negative other than symptoms noted above in HPI, Past Medical History or as stated below  Constitutional: NEGATIVE for fever, chills, change in weight  Skin: NEGATIVE for worrisome rashes, moles or lesions  GI/: NEGATIVE for bowel or bladder changes  Neuro: NEGATIVE for weakness, dizziness or paresthesias    OBJECTIVE:  Vital signs as noted in EPIC for 9/14/2024  General: healthy, alert and in no distress  HEENT: no scleral icterus or conjunctival erythema  Skin: no suspicious lesions or rash. No jaundice.  CV: no pedal edema  Resp: normal respiratory effort without conversational dyspnea   Psych: normal mood and affect  Gait: normal steady gait with appropriate coordination and balance  Neuro: Normal light sensory exam of lower extremity      MSK:  Exam shows a very muscular 39-year-old male who ambulates weightbearing without assistive device.  He is alert and oriented x 3.  With short removed patient shows no asymmetry.  No bruising, swelling or ecchymosis.  Patient has discomfort of the right shoulder with terminal flexion above shoulder height lacking approximately 10 degrees of terminal flexion compared to contralateral shoulder which again is uncomfortable and he describes pain more over the top of the shoulder deltoid region.  Abduction of the left shoulder is above 130 degrees without pain.  Right symptomatic shoulder gets to about 90 degrees with pain  again in the proximal right shoulder deltoid region.  No pain to palpation throughout the cervical spine, trapezius musculature, scapular body or surrounding tissues, no pain at the sternum, SC joint, clavicle or AC joint.  AC joint loading test is negative.  No pain to palpation throughout the entire deltoid including deltoid tuberosity.  No pain in the anterior subacromial space or bicipital groove.  No biceps or tricep deformity.  No pain along the anterior or posterior joint line.  No pain along the pectoralis major or minor region.  No pain in the proximal humeral shaft.  Patient has 5 out of 5 motor tone with internal and external rotators with increased shoulder discomfort with testing of the external rotator.  [place]'s and speeds test are largely unremarkable.  Patient does have discomfort with impingement testing on the right shoulder.  Empty can testing generates shoulder discomfort but no weakness.  With the arm abducted to 90 degrees for impingement testing patient has increased pain with resisted external rotation.  Liftoff test negative.  Sulcus sign is negative.  Negative apprehension sign.  Patient is neurovascular intact through both upper extremities with +2 radial pulses.  Full range of motion of the elbow wrist and fingers.  No pain with range of motion of the C-spine.            Personal Independent visualization of the below images done today:  Previous x-rays of the right shoulder personally reviewed and reviewed with the patient.  Normal exam    Narrative & Impression  EXAM: XR SHOULDER RIGHT G/E 3 VIEWS  LOCATION: [facility]  DATE: 9/5/2024     INDICATION:  Right shoulder pain, unspecified chronicity  COMPARISON: None.                                                                      IMPRESSION: Normal joint spaces and alignment. No fracture.    Patient's conditions were thoroughly discussed during today's visit with total time reviewing patient's previous medical  records/history/radiology, face-to-face examination and discussion and plan of care with the patient and documentation being 30 minutes for today's clinical visit  Willard John PA-C  [facility]    This note was completed in part using a voice recognition software, any grammatical or context distortion are unintentional and inherent to the software.

## 2024-10-24 DIAGNOSIS — F32.A ANXIETY AND DEPRESSION: ICD-10-CM

## 2024-10-24 DIAGNOSIS — F41.9 ANXIETY AND DEPRESSION: ICD-10-CM

## 2024-10-24 RX ORDER — ESCITALOPRAM OXALATE 20 MG/1
20 TABLET ORAL DAILY
Qty: 30 TABLET | Refills: 2 | Status: SHIPPED | OUTPATIENT
Start: 2024-10-24

## 2025-03-10 ENCOUNTER — PATIENT OUTREACH (OUTPATIENT)
Dept: CARE COORDINATION | Facility: CLINIC | Age: 40
End: 2025-03-10
Payer: COMMERCIAL

## 2025-03-10 DIAGNOSIS — F32.A ANXIETY AND DEPRESSION: ICD-10-CM

## 2025-03-10 DIAGNOSIS — F41.9 ANXIETY AND DEPRESSION: ICD-10-CM

## 2025-03-10 RX ORDER — ESCITALOPRAM OXALATE 20 MG/1
20 TABLET ORAL DAILY
Qty: 30 TABLET | Refills: 0 | Status: SHIPPED | OUTPATIENT
Start: 2025-03-10

## 2025-03-11 ENCOUNTER — ANCILLARY PROCEDURE (OUTPATIENT)
Dept: GENERAL RADIOLOGY | Facility: CLINIC | Age: 40
End: 2025-03-11
Attending: PHYSICIAN ASSISTANT
Payer: COMMERCIAL

## 2025-03-11 ENCOUNTER — OFFICE VISIT (OUTPATIENT)
Dept: URGENT CARE | Facility: URGENT CARE | Age: 40
End: 2025-03-11
Payer: COMMERCIAL

## 2025-03-11 VITALS
TEMPERATURE: 99.1 F | SYSTOLIC BLOOD PRESSURE: 133 MMHG | BODY MASS INDEX: 39.57 KG/M2 | OXYGEN SATURATION: 97 % | HEART RATE: 88 BPM | RESPIRATION RATE: 18 BRPM | DIASTOLIC BLOOD PRESSURE: 81 MMHG | WEIGHT: 295 LBS

## 2025-03-11 DIAGNOSIS — R05.1 ACUTE COUGH: Primary | ICD-10-CM

## 2025-03-11 DIAGNOSIS — Z11.52 ENCOUNTER FOR SCREENING FOR COVID-19: ICD-10-CM

## 2025-03-11 DIAGNOSIS — B34.9 VIRAL ILLNESS: ICD-10-CM

## 2025-03-11 LAB
DEPRECATED S PYO AG THROAT QL EIA: NEGATIVE
FLUAV AG SPEC QL IA: NEGATIVE
FLUBV AG SPEC QL IA: NEGATIVE
S PYO DNA THROAT QL NAA+PROBE: NOT DETECTED
SARS-COV-2 RNA RESP QL NAA+PROBE: POSITIVE

## 2025-03-11 PROCEDURE — 87635 SARS-COV-2 COVID-19 AMP PRB: CPT | Performed by: PHYSICIAN ASSISTANT

## 2025-03-11 PROCEDURE — 71046 X-RAY EXAM CHEST 2 VIEWS: CPT | Mod: TC | Performed by: RADIOLOGY

## 2025-03-11 PROCEDURE — 3075F SYST BP GE 130 - 139MM HG: CPT | Performed by: PHYSICIAN ASSISTANT

## 2025-03-11 PROCEDURE — 3079F DIAST BP 80-89 MM HG: CPT | Performed by: PHYSICIAN ASSISTANT

## 2025-03-11 PROCEDURE — 99214 OFFICE O/P EST MOD 30 MIN: CPT | Performed by: PHYSICIAN ASSISTANT

## 2025-03-11 PROCEDURE — 87804 INFLUENZA ASSAY W/OPTIC: CPT | Performed by: PHYSICIAN ASSISTANT

## 2025-03-11 PROCEDURE — 87651 STREP A DNA AMP PROBE: CPT | Performed by: PHYSICIAN ASSISTANT

## 2025-03-11 RX ORDER — PREDNISONE 20 MG/1
40 TABLET ORAL DAILY
Qty: 10 TABLET | Refills: 0 | Status: SHIPPED | OUTPATIENT
Start: 2025-03-11 | End: 2025-03-16

## 2025-03-11 RX ORDER — ALBUTEROL SULFATE 90 UG/1
2 INHALANT RESPIRATORY (INHALATION) EVERY 6 HOURS
Qty: 18 G | Refills: 0 | Status: SHIPPED | OUTPATIENT
Start: 2025-03-11 | End: 2025-04-10

## 2025-03-11 RX ORDER — BENZONATATE 100 MG/1
100 CAPSULE ORAL 3 TIMES DAILY PRN
Qty: 30 CAPSULE | Refills: 0 | Status: SHIPPED | OUTPATIENT
Start: 2025-03-11 | End: 2025-03-21

## 2025-03-11 NOTE — PROGRESS NOTES
Patient presents with:  Cough: Has had cough for over 1 week chest tightness fatigue no fever      Clinical Decision Making:  Strep test was obtained and was negative.  Culture is to follow.  Influenza is negative. COVID-19 screening test is pending.  Chest xray was negative.  Patient is treated with albuterol inhaler, Tessalon Perles and prednisone for the congestion and wheezing heard in the chest.  Symptomatic care was gone over. Expected course of resolution and indication for return was gone over and questions were answered to patient/parent's satisfaction before discharge.       ICD-10-CM    1. Acute cough  R05.1 Streptococcus A Rapid Screen w/Reflex to PCR     Influenza A & B Antigen     COVID-19 Virus (Coronavirus) by PCR Nose     XR Chest 2 Views     Group A Streptococcus PCR Throat Swab     albuterol (PROAIR HFA/PROVENTIL HFA/VENTOLIN HFA) 108 (90 Base) MCG/ACT inhaler     benzonatate (TESSALON) 100 MG capsule     predniSONE (DELTASONE) 20 MG tablet      2. Encounter for screening for COVID-19  Z11.52 COVID-19 Virus (Coronavirus) by PCR Nose      3. Viral illness  B34.9           Patient Instructions   You were seen today for acute bronchitis. This is likely due to a viral illness.    Symptom management:  - Get plenty of rest  - Avoid smoking and second hand smoke  - May take tylenol or ibuprofen for fever/discomfort  - Drink plenty of non-caffeinated fluids  - Use nasal steroid spray for sinus congestion  - Albuterol inhaler may be used every 6 hours as needed for chest tightness      Reasons to be seen in the emergency room:  - Develop a fever of 100.4 or higher  - Cough changes, coughing up blood, or become short of breath  - Neck stiffness  - Chest pain  - Severe headache  - Unable to tolerate eating or drinking fluids    Otherwise, if no symptom improvement after 5 days, follow-up with your primary care provider.        HPI:  Jak Ortega is a 39 year old male who presents today for a 1 week history  of cough chest congestion fatigue sore throat chills night sweats.  Patient has not had syncope presyncope heart palpitations pleuritic chest pain nausea vomiting diarrhea anorexia.  No COVID testing was performed at home.  No reported sick contacts.  Treatment at home was consisted of Tylenol and ibuprofen with last dose of ibuprofen taken 1 hour ago.     History obtained from chart review and the patient.    Problem List:  2016-02: Anxiety and depression      No past medical history on file.    Social History     Tobacco Use    Smoking status: Never     Passive exposure: Never    Smokeless tobacco: Never   Substance Use Topics    Alcohol use: Yes     Alcohol/week: 6.7 standard drinks of alcohol       Review of Systems  As above in HPI otherwise negative.    Vitals:    03/11/25 1017   BP: 133/81   Pulse: 88   Resp: 18   Temp: 99.1  F (37.3  C)   TempSrc: Oral   SpO2: 97%   Weight: 133.8 kg (295 lb)       General: Patient is resting comfortably no acute distress is afebrile  HEENT: Head is normocephalic atraumatic   eyes are PERRL EOMI sclera anicteric   TMs are clear bilaterally  Throat is with mild pharyngeal wall erythema and no exudate  No cervical lymphadenopathy present  LUNGS: Clear to auscultation bilaterally  HEART: Regular rate and rhythm  Skin: Without rash non-diaphoretic    Physical Exam      Labs:  Results for orders placed or performed in visit on 03/11/25   XR Chest 2 Views     Status: None    Narrative    EXAM: XR CHEST 2 VIEWS  LOCATION: [facility]  DATE: 3/11/2025    INDICATION: Cough x 4 days with left lung wheezing.  COMPARISON: 3/3/2019.      Impression    IMPRESSION: Negative chest. Lungs are clear.     Streptococcus A Rapid Screen w/Reflex to PCR     Status: Normal    Specimen: Throat; Swab   Result Value Ref Range    Group A Strep antigen Negative Negative   Influenza A & B Antigen     Status: Normal    Specimen: Nose; Swab   Result Value Ref Range    Influenza A  antigen Negative Negative    Influenza B antigen Negative Negative    Narrative    Test results must be correlated with clinical data. If necessary, results should be confirmed by a molecular assay or viral culture.       Radiology:  I have personally ordered and preliminarily reviewed the following xray, I have noted no infiltrate or consolidation.      At the end of the encounter, I discussed results, diagnosis, medications. Discussed red flags for immediate return to clinic/ER, as well as indications for follow up if no improvement. Patient understood and agreed to plan. Patient was stable for discharge.

## 2025-03-12 ENCOUNTER — VIRTUAL VISIT (OUTPATIENT)
Dept: FAMILY MEDICINE | Facility: CLINIC | Age: 40
End: 2025-03-12
Payer: COMMERCIAL

## 2025-03-12 DIAGNOSIS — U07.1 INFECTION DUE TO 2019 NOVEL CORONAVIRUS: Primary | ICD-10-CM

## 2025-03-12 PROCEDURE — 1125F AMNT PAIN NOTED PAIN PRSNT: CPT | Mod: 95 | Performed by: NURSE PRACTITIONER

## 2025-03-12 PROCEDURE — 98005 SYNCH AUDIO-VIDEO EST LOW 20: CPT | Performed by: NURSE PRACTITIONER

## 2025-03-12 NOTE — PROGRESS NOTES
"Jak is a 39 year old who is being evaluated via a billable video visit.    How would you like to obtain your AVS? MyChart  If the video visit is dropped, the invitation should be resent by: Text to cell phone: [phone]  Will anyone else be joining your video visit? No      Assessment & Plan     Infection due to 2019 novel coronavirus  Seen in urgent care yesterday, prescribed benzonatate, albuterol, prednisone. Has not yet started this. Scheduled appt to discuss antivirals, however he is 8 days into illness, so no benefit. Discussed he is otherwise healthy, symptoms should improve on their own. Symptomatic care discussed.          BMI  Estimated body mass index is 39.57 kg/m  as calculated from the following:    Height as of 5/8/24: 1.839 m (6' 0.4\").    Weight as of 3/11/25: 133.8 kg (295 lb).         See Patient Instructions    Subjective   Jak is a 39 year old, presenting for the following health issues:  Covid Concern (Tested positive 3/11/2025)      3/12/2025    11:10 AM   Additional Questions   Roomed by YUKI Fleming [facility]     HPI      Acute Illness  Acute illness concerns: Covid- tested positive 3/11/2025  Onset/Duration: 8 days  Symptoms:  Fever: YES- low grade  Chills/Sweats: YES  Headache (location?): YES  Sinus Pressure: YES  Conjunctivitis:  No  Ear Pain: no  Rhinorrhea: No  Congestion: YES  Sore Throat: YES  Cough: YES  Wheeze: YES  Decreased Appetite: YES  Nausea: YES  Vomiting: No  Diarrhea: No  Dysuria/Freq.: No  Dysuria or Hematuria: No  Fatigue/Achiness: YES  Sick/Strep Exposure: No  Therapies tried and outcome: None        Review of Systems  Constitutional, HEENT, cardiovascular, pulmonary, GI, , musculoskeletal, neuro, skin, endocrine and psych systems are negative, except as otherwise noted.      Objective    Vitals - Patient Reported  Pain Score: Moderate Pain (6)        Physical Exam   GENERAL: alert and no distress  EYES: Eyes grossly normal to inspection.  No discharge or erythema, or " obvious scleral/conjunctival abnormalities.  RESP: No audible wheeze, cough, or visible cyanosis.    SKIN: Visible skin clear. No significant rash, abnormal pigmentation or lesions.  NEURO: Cranial nerves grossly intact.  Mentation and speech appropriate for age.  PSYCH: Appropriate affect, tone, and pace of words          Video-Visit Details    Type of service:  Video Visit   Originating Location (pt. Location): Home    Distant Location (provider location):  On-site  Platform used for Video Visit: Clare  Signed Electronically by: ALYSSA Quintanilla CNP

## 2025-03-24 ENCOUNTER — PATIENT OUTREACH (OUTPATIENT)
Dept: CARE COORDINATION | Facility: CLINIC | Age: 40
End: 2025-03-24
Payer: COMMERCIAL

## 2025-04-08 DIAGNOSIS — F32.A ANXIETY AND DEPRESSION: ICD-10-CM

## 2025-04-08 DIAGNOSIS — F41.9 ANXIETY AND DEPRESSION: ICD-10-CM

## 2025-04-09 RX ORDER — ESCITALOPRAM OXALATE 20 MG/1
20 TABLET ORAL DAILY
Qty: 30 TABLET | Refills: 0 | Status: SHIPPED | OUTPATIENT
Start: 2025-04-09

## 2025-05-05 ENCOUNTER — OFFICE VISIT (OUTPATIENT)
Dept: FAMILY MEDICINE | Facility: CLINIC | Age: 40
End: 2025-05-05
Payer: COMMERCIAL

## 2025-05-05 VITALS
SYSTOLIC BLOOD PRESSURE: 122 MMHG | BODY MASS INDEX: 39.31 KG/M2 | HEIGHT: 73 IN | DIASTOLIC BLOOD PRESSURE: 70 MMHG | OXYGEN SATURATION: 97 % | WEIGHT: 296.6 LBS | HEART RATE: 82 BPM | TEMPERATURE: 98.6 F | RESPIRATION RATE: 16 BRPM

## 2025-05-05 DIAGNOSIS — F33.1 MODERATE RECURRENT MAJOR DEPRESSION (H): ICD-10-CM

## 2025-05-05 DIAGNOSIS — E55.9 VITAMIN D DEFICIENCY: ICD-10-CM

## 2025-05-05 DIAGNOSIS — M79.672 PAIN IN BOTH FEET: ICD-10-CM

## 2025-05-05 DIAGNOSIS — R53.83 FATIGUE, UNSPECIFIED TYPE: ICD-10-CM

## 2025-05-05 DIAGNOSIS — M79.671 PAIN IN BOTH FEET: ICD-10-CM

## 2025-05-05 DIAGNOSIS — F32.A ANXIETY AND DEPRESSION: ICD-10-CM

## 2025-05-05 DIAGNOSIS — Z00.00 ROUTINE GENERAL MEDICAL EXAMINATION AT A HEALTH CARE FACILITY: Primary | ICD-10-CM

## 2025-05-05 DIAGNOSIS — F41.9 ANXIETY AND DEPRESSION: ICD-10-CM

## 2025-05-05 LAB
ERYTHROCYTE [DISTWIDTH] IN BLOOD BY AUTOMATED COUNT: 11.8 % (ref 10–15)
HCT VFR BLD AUTO: 41.3 % (ref 40–53)
HGB BLD-MCNC: 14.4 G/DL (ref 13.3–17.7)
MCH RBC QN AUTO: 29.3 PG (ref 26.5–33)
MCHC RBC AUTO-ENTMCNC: 34.9 G/DL (ref 31.5–36.5)
MCV RBC AUTO: 84 FL (ref 78–100)
PLATELET # BLD AUTO: 320 10E3/UL (ref 150–450)
RBC # BLD AUTO: 4.91 10E6/UL (ref 4.4–5.9)
WBC # BLD AUTO: 6.9 10E3/UL (ref 4–11)

## 2025-05-05 PROCEDURE — 36415 COLL VENOUS BLD VENIPUNCTURE: CPT | Performed by: NURSE PRACTITIONER

## 2025-05-05 PROCEDURE — 85027 COMPLETE CBC AUTOMATED: CPT | Performed by: NURSE PRACTITIONER

## 2025-05-05 PROCEDURE — 99395 PREV VISIT EST AGE 18-39: CPT | Mod: 25 | Performed by: NURSE PRACTITIONER

## 2025-05-05 PROCEDURE — 99214 OFFICE O/P EST MOD 30 MIN: CPT | Mod: 25 | Performed by: NURSE PRACTITIONER

## 2025-05-05 PROCEDURE — 82306 VITAMIN D 25 HYDROXY: CPT | Performed by: NURSE PRACTITIONER

## 2025-05-05 PROCEDURE — 1125F AMNT PAIN NOTED PAIN PRSNT: CPT | Performed by: NURSE PRACTITIONER

## 2025-05-05 PROCEDURE — 3074F SYST BP LT 130 MM HG: CPT | Performed by: NURSE PRACTITIONER

## 2025-05-05 PROCEDURE — 84443 ASSAY THYROID STIM HORMONE: CPT | Performed by: NURSE PRACTITIONER

## 2025-05-05 PROCEDURE — 3078F DIAST BP <80 MM HG: CPT | Performed by: NURSE PRACTITIONER

## 2025-05-05 PROCEDURE — 90471 IMMUNIZATION ADMIN: CPT | Performed by: NURSE PRACTITIONER

## 2025-05-05 PROCEDURE — 90715 TDAP VACCINE 7 YRS/> IM: CPT | Performed by: NURSE PRACTITIONER

## 2025-05-05 PROCEDURE — 80053 COMPREHEN METABOLIC PANEL: CPT | Performed by: NURSE PRACTITIONER

## 2025-05-05 RX ORDER — ESCITALOPRAM OXALATE 20 MG/1
20 TABLET ORAL DAILY
Qty: 90 TABLET | Refills: 3 | Status: CANCELLED | OUTPATIENT
Start: 2025-05-05

## 2025-05-05 SDOH — HEALTH STABILITY: PHYSICAL HEALTH: ON AVERAGE, HOW MANY DAYS PER WEEK DO YOU ENGAGE IN MODERATE TO STRENUOUS EXERCISE (LIKE A BRISK WALK)?: 5 DAYS

## 2025-05-05 ASSESSMENT — PATIENT HEALTH QUESTIONNAIRE - PHQ9
SUM OF ALL RESPONSES TO PHQ QUESTIONS 1-9: 15
SUM OF ALL RESPONSES TO PHQ QUESTIONS 1-9: 15
10. IF YOU CHECKED OFF ANY PROBLEMS, HOW DIFFICULT HAVE THESE PROBLEMS MADE IT FOR YOU TO DO YOUR WORK, TAKE CARE OF THINGS AT HOME, OR GET ALONG WITH OTHER PEOPLE: VERY DIFFICULT

## 2025-05-05 ASSESSMENT — ANXIETY QUESTIONNAIRES
7. FEELING AFRAID AS IF SOMETHING AWFUL MIGHT HAPPEN: SEVERAL DAYS
GAD7 TOTAL SCORE: 12
6. BECOMING EASILY ANNOYED OR IRRITABLE: MORE THAN HALF THE DAYS
IF YOU CHECKED OFF ANY PROBLEMS ON THIS QUESTIONNAIRE, HOW DIFFICULT HAVE THESE PROBLEMS MADE IT FOR YOU TO DO YOUR WORK, TAKE CARE OF THINGS AT HOME, OR GET ALONG WITH OTHER PEOPLE: VERY DIFFICULT
8. IF YOU CHECKED OFF ANY PROBLEMS, HOW DIFFICULT HAVE THESE MADE IT FOR YOU TO DO YOUR WORK, TAKE CARE OF THINGS AT HOME, OR GET ALONG WITH OTHER PEOPLE?: VERY DIFFICULT
GAD7 TOTAL SCORE: 12
7. FEELING AFRAID AS IF SOMETHING AWFUL MIGHT HAPPEN: SEVERAL DAYS
1. FEELING NERVOUS, ANXIOUS, OR ON EDGE: MORE THAN HALF THE DAYS
2. NOT BEING ABLE TO STOP OR CONTROL WORRYING: MORE THAN HALF THE DAYS
GAD7 TOTAL SCORE: 12
5. BEING SO RESTLESS THAT IT IS HARD TO SIT STILL: SEVERAL DAYS
3. WORRYING TOO MUCH ABOUT DIFFERENT THINGS: MORE THAN HALF THE DAYS
4. TROUBLE RELAXING: MORE THAN HALF THE DAYS

## 2025-05-05 ASSESSMENT — SOCIAL DETERMINANTS OF HEALTH (SDOH): HOW OFTEN DO YOU GET TOGETHER WITH FRIENDS OR RELATIVES?: TWICE A WEEK

## 2025-05-05 ASSESSMENT — PAIN SCALES - GENERAL: PAINLEVEL_OUTOF10: MODERATE PAIN (5)

## 2025-05-05 NOTE — PATIENT INSTRUCTIONS
Patient Education   Preventive Care Advice   This is general advice given by our system to help you stay healthy. However, your care team may have specific advice just for you. Please talk to your care team about your preventive care needs.  Nutrition  Eat 5 or more servings of fruits and vegetables each day.  Try wheat bread, brown rice and whole grain pasta (instead of white bread, rice, and pasta).  Get enough calcium and vitamin D. Check the label on foods and aim for 100% of the RDA (recommended daily allowance).  Lifestyle  Exercise at least 150 minutes each week  (30 minutes a day, 5 days a week).  Do muscle strengthening activities 2 days a week. These help control your weight and prevent disease.  No smoking.  Wear sunscreen to prevent skin cancer.  Have a dental exam and cleaning every 6 months.  Yearly exams  See your health care team every year to talk about:  Any changes in your health.  Any medicines your care team has prescribed.  Preventive care, family planning, and ways to prevent chronic diseases.  Shots (vaccines)   HPV shots (up to age 26), if you've never had them before.  Hepatitis B shots (up to age 59), if you've never had them before.  COVID-19 shot: Get this shot when it's due.  Flu shot: Get a flu shot every year.  Tetanus shot: Get a tetanus shot every 10 years.  Pneumococcal, hepatitis A, and RSV shots: Ask your care team if you need these based on your risk.  Shingles shot (for age 50 and up)  General health tests  Diabetes screening:  Starting at age 35, Get screened for diabetes at least every 3 years.  If you are younger than age 35, ask your care team if you should be screened for diabetes.  Cholesterol test: At age 39, start having a cholesterol test every 5 years, or more often if advised.  Bone density scan (DEXA): At age 50, ask your care team if you should have this scan for osteoporosis (brittle bones).  Hepatitis C: Get tested at least once in your life.  STIs (sexually  transmitted infections)  Before age 24: Ask your care team if you should be screened for STIs.  After age 24: Get screened for STIs if you're at risk. You are at risk for STIs (including HIV) if:  You are sexually active with more than one person.  You don't use condoms every time.  You or a partner was diagnosed with a sexually transmitted infection.  If you are at risk for HIV, ask about PrEP medicine to prevent HIV.  Get tested for HIV at least once in your life, whether you are at risk for HIV or not.  Cancer screening tests  Cervical cancer screening: If you have a cervix, begin getting regular cervical cancer screening tests starting at age 21.  Breast cancer scan (mammogram): If you've ever had breasts, begin having regular mammograms starting at age 40. This is a scan to check for breast cancer.  Colon cancer screening: It is important to start screening for colon cancer at age 45.  Have a colonoscopy test every 10 years (or more often if you're at risk) Or, ask your provider about stool tests like a FIT test every year or Cologuard test every 3 years.  To learn more about your testing options, visit:   .  For help making a decision, visit:   https://bit.ly/eb89163.  Prostate cancer screening test: If you have a prostate, ask your care team if a prostate cancer screening test (PSA) at age 55 is right for you.  Lung cancer screening: If you are a current or former smoker ages 50 to 80, ask your care team if ongoing lung cancer screenings are right for you.  For informational purposes only. Not to replace the advice of your health care provider. Copyright   2023 [facility]. All rights reserved. Clinically reviewed by the [facility]. Watson Brown [id number] - REV 01/24.  Learning About Depression Screening  What is depression screening?  Depression screening is a way to see if you have depression symptoms. It may be done by a doctor or counselor. It's often part of a routine  "checkup. That's because your mental health is just as important as your physical health.  Depression is a mental health condition that affects how you feel, think, and act. You may:  Have less energy.  Lose interest in your daily activities.  Feel sad and grouchy for a long time.  Depression is very common. It affects people of all ages.  Many things can lead to depression. Some people become depressed after they have a stroke or find out they have a major illness like cancer or heart disease. The death of a loved one or a breakup may lead to depression. It can run in families. Most experts believe that a combination of inherited genes and stressful life events can cause it.  What happens during screening?  You may be asked to fill out a form about your depression symptoms. You and the doctor will discuss your answers. The doctor may ask you more questions to learn more about how you think, act, and feel.  What happens after screening?  If you have symptoms of depression, your doctor will talk to you about your options.  Doctors usually treat depression with medicines or counseling. Often, combining the two works best. Many people don't get help because they think that they'll get over the depression on their own. But people with depression may not get better unless they get treatment.  The cause of depression is not well understood. There may be many factors involved. But if you have depression, it's not your fault.  A serious symptom of depression is thinking about death or suicide. If you or someone you care about talks about this or about feeling hopeless, get help right away.  It's important to know that depression can be treated. Medicine, counseling, and self-care may help.  Where can you learn more?  Go to https://www.healthwise.net/patiented  Enter T185 in the search box to learn more about \"Learning About Depression Screening.\"  Current as of: July 31, 2024  Content Version: 14.4    8634-2186 Александр " [employer].   Care instructions adapted under license by your healthcare professional. If you have questions about a medical condition or this instruction, always ask your healthcare professional. [facility] [employer] disclaims any warranty or liability for your use of this information.

## 2025-05-05 NOTE — PROGRESS NOTES
Preventive Care Visit  [facility]  Brionna Arciniega CNP, Nurse Practitioner Primary Care  May 5, 2025        Kj Benedict is a 39 year old, presenting for the following:  Physical (Labs Non fasting)        5/5/2025     2:48 PM   Additional Questions   Roomed by LH LPN        Healthy Habits:     Getting at least 3 servings of Calcium per day:  Yes    Bi-annual eye exam:  NO    Dental care twice a year:  Yes    Sleep apnea or symptoms of sleep apnea:  None    Diet:  Regular (no restrictions)    Frequency of exercise:  4-5 days/week    Duration of exercise:  45-60 minutes    Taking medications regularly:  Yes    Barriers to taking medications:  None    Medication side effects:  None    Additional concerns today:  Yes (Medication Questions)    Feels emotionally blunted on Lexapro- would like to look into other mediation options.  Fatigue, low energy, weight gain.      Depression and Anxiety   How are you doing with your depression since your last visit? No change  How are you doing with your anxiety since your last visit?  No change  Are you having other symptoms that might be associated with depression or anxiety? Yes:  fatigue  Have you had a significant life event? No   Do you have any concerns with your use of alcohol or other drugs? No    Social History     Tobacco Use    Smoking status: Never     Passive exposure: Never    Smokeless tobacco: Never   Vaping Use    Vaping status: Never Used   Substance Use Topics    Alcohol use: Yes     Alcohol/week: 6.7 standard drinks of alcohol    Drug use: No         4/8/2024     2:43 PM 5/5/2025     2:43 PM   PHQ   PHQ-9 Total Score 12 15    Q9: Thoughts of better off dead/self-harm past 2 weeks Several days Several days   F/U: Thoughts of suicide or self-harm Yes Yes   F/U: Self harm-plan No No   F/U: Self-harm action No No   F/U: Safety concerns Yes Yes       Patient-reported         4/8/2024     2:44 PM 5/8/2024     2:17 PM 5/5/2025     2:44  PM   NIKI-7 SCORE   Total Score 16 (severe anxiety) 3 (minimal anxiety) 12 (moderate anxiety)   Total Score 16 3 12        Patient-reported         5/5/2025     2:43 PM   Last PHQ-9   1.  Little interest or pleasure in doing things 2   2.  Feeling down, depressed, or hopeless 1   3.  Trouble falling or staying asleep, or sleeping too much 0   4.  Feeling tired or having little energy 3   5.  Poor appetite or overeating 2   6.  Feeling bad about yourself 2   7.  Trouble concentrating 3   8.  Moving slowly or restless 1   Q9: Thoughts of better off dead/self-harm past 2 weeks 1   PHQ-9 Total Score 15    In the past two weeks have you had thoughts of suicide or self harm? Yes   Do you have concerns about your personal safety or the safety of others? Yes   In the past 2 weeks have you thought about a plan or had intention to harm yourself? No   In the past 2 weeks have you acted on these thoughts in any way? No       Patient-reported         5/5/2025     2:44 PM   NIKI-7    1. Feeling nervous, anxious, or on edge 2   2. Not being able to stop or control worrying 2   3. Worrying too much about different things 2   4. Trouble relaxing 2   5. Being so restless that it is hard to sit still 1   6. Becoming easily annoyed or irritable 2   7. Feeling afraid, as if something awful might happen 1   NIKI-7 Total Score 12    If you checked any problems, how difficult have they made it for you to do your work, take care of things at home, or get along with other people? Very difficult       Patient-reported           States he doesn't feel suicidal or homicidal.      Follow Up Actions Taken  Crisis resource information provided in the After Visit Summary  Patient declined referral.     Discussed the following ways the patient can remain in a safe environment:  remove alcohol and be around others  Suicide Assessment Five-step Evaluation and Treatment (SAFE-T)    Advance Care Planning    Discussed advance care planning with patient;  informed AVS has link to [facility].        5/5/2025   General Health   How would you rate your overall physical health? (!) POOR   Feel stress (tense, anxious, or unable to sleep) Rather much   (!) STRESS CONCERN      5/5/2025   Nutrition   Three or more servings of calcium each day? (!) I DON'T KNOW   Diet: Regular (no restrictions)   How many servings of fruit and vegetables per day? 4 or more   How many sweetened beverages each day? 0-1         5/5/2025   Exercise   Days per week of moderate/strenous exercise 5 days         5/5/2025   Social Factors   Frequency of gathering with friends or relatives Twice a week   Worry food won't last until get money to buy more No   Food not last or not have enough money for food? Yes   Do you have housing? (Housing is defined as stable permanent housing and does not include staying outside in a car, in a tent, in an abandoned building, in an overnight shelter, or couch-surfing.) Yes   Are you worried about losing your housing? No   Lack of transportation? No   Unable to get utilities (heat,electricity)? No   (!) FOOD SECURITY CONCERN PRESENT      5/5/2025   Dental   Dentist two times every year? Yes       Today's PHQ-9 Score:       5/5/2025     2:43 PM   PHQ-9 SCORE   PHQ-9 Total Score MyChart 15 (Moderately severe depression)   PHQ-9 Total Score 15        Patient-reported         5/5/2025   Substance Use   Alcohol more than 3/day or more than 7/wk Yes   How often do you have a drink containing alcohol 2 to 3 times a week   How many alcohol drinks on typical day 3 or 4   How often do you have 5+ drinks at one occasion Monthly   Audit 2/3 Score 3   How often not able to stop drinking once started Never   How often failed to do what normally expected Never   How often needed first drink in am after a heavy drinking session Never   How often feeling of guilt or remorse after drinking Never   How often unable to remember what happened the night before Never   Have you or  "someone else been injured because of your drinking No   Has anyone been concerned or suggested you cut down on drinking No   TOTAL SCORE - AUDIT 6   Do you use any other substances recreationally? (!) CANNABIS PRODUCTS     Social History     Tobacco Use    Smoking status: Never     Passive exposure: Never    Smokeless tobacco: Never   Vaping Use    Vaping status: Never Used   Substance Use Topics    Alcohol use: Yes     Alcohol/week: 6.7 standard drinks of alcohol    Drug use: No           5/5/2025   STI Screening   New sexual partner(s) since last STI/HIV test? No         5/5/2025   Contraception/Family Planning   Questions about contraception or family planning No        Reviewed and updated as needed this visit by Provider                    History reviewed. No pertinent past medical history.  Past Surgical History:   Procedure Laterality Date    NO HISTORY OF SURGERY       Lab work is in process  Labs reviewed in EPIC      Review of Systems  Constitutional, HEENT, cardiovascular, pulmonary, GI, , musculoskeletal, neuro, skin, endocrine and psych systems are negative, except as otherwise noted.  Painful feet for several months- R>L, worse in the mornings.      Objective    Exam  /70 (BP Location: Left arm, Patient Position: Sitting, Cuff Size: Adult Regular)   Pulse 82   Temp 98.6  F (37  C) (Oral)   Resp 16   Ht 1.842 m (6' 0.5\")   Wt 134.5 kg (296 lb 9.6 oz)   SpO2 97%   BMI 39.67 kg/m     Estimated body mass index is 39.67 kg/m  as calculated from the following:    Height as of this encounter: 1.842 m (6' 0.5\").    Weight as of this encounter: 134.5 kg (296 lb 9.6 oz).    Physical Exam  GENERAL: alert and no distress  EYES: Eyes grossly normal to inspection, PERRL and conjunctivae and sclerae normal  HENT: ear canals and TM's normal, nose and mouth without ulcers or lesions  NECK: no adenopathy, no asymmetry, masses, or scars  RESP: lungs clear to auscultation - no rales, rhonchi or " wheezes  CV: regular rate and rhythm, normal S1 S2, no S3 or S4, no murmur, click or rub, no peripheral edema  ABDOMEN: soft, nontender, no hepatosplenomegaly, no masses and bowel sounds normal  MS: no gross musculoskeletal defects noted, no edema  SKIN: no suspicious lesions or rashes  NEURO: Normal strength and tone, mentation intact and speech normal  PSYCH: mentation appears normal, affect normal/bright    A/P:  1. Routine general medical examination at a health care facility (Primary)    2. Anxiety and depression  Switch to Prozac.  Follow-up in 1 month.  - Comprehensive metabolic panel (BMP + Alb, Alk Phos, ALT, AST, Total. Bili, TP); Future  - Vitamin D Deficiency; Future  - TSH with free T4 reflex; Future  - CBC with platelets; Future  - FLUoxetine (PROZAC) 20 MG capsule; Take 1 capsule (20 mg) by mouth daily.  Dispense: 30 capsule; Refill: 1  - Comprehensive metabolic panel (BMP + Alb, Alk Phos, ALT, AST, Total. Bili, TP)  - Vitamin D Deficiency  - TSH with free T4 reflex  - CBC with platelets    3. Fatigue, unspecified type  - Comprehensive metabolic panel (BMP + Alb, Alk Phos, ALT, AST, Total. Bili, TP); Future  - TSH with free T4 reflex; Future  - CBC with platelets; Future  - Comprehensive metabolic panel (BMP + Alb, Alk Phos, ALT, AST, Total. Bili, TP)  - TSH with free T4 reflex  - CBC with platelets    4. Vitamin D deficiency  - Vitamin D Deficiency; Future  - Vitamin D Deficiency    5. Pain in both feet  - Orthopedic  Referral; Future    6. Moderate recurrent major depression (H)  Prozac 20 mg/day    Signed Electronically by: Brionna Arciniega CNP    Answers submitted by the patient for this visit:  Patient Health Questionnaire (Submitted on 5/5/2025)  If you checked off any problems, how difficult have these problems made it for you to do your work, take care of things at home, or get along with other people?: Very difficult  PHQ9 TOTAL SCORE: 15  Patient Health Questionnaire (G7) (Submitted  on 5/5/2025)  NIKI 7 TOTAL SCORE: 12

## 2025-05-06 ENCOUNTER — PATIENT OUTREACH (OUTPATIENT)
Dept: CARE COORDINATION | Facility: CLINIC | Age: 40
End: 2025-05-06
Payer: COMMERCIAL

## 2025-05-06 PROBLEM — F33.1 MODERATE RECURRENT MAJOR DEPRESSION (H): Status: ACTIVE | Noted: 2025-05-06

## 2025-05-06 LAB
ALBUMIN SERPL BCG-MCNC: 4.5 G/DL (ref 3.5–5.2)
ALP SERPL-CCNC: 64 U/L (ref 40–150)
ALT SERPL W P-5'-P-CCNC: 44 U/L (ref 0–70)
ANION GAP SERPL CALCULATED.3IONS-SCNC: 9 MMOL/L (ref 7–15)
AST SERPL W P-5'-P-CCNC: 33 U/L (ref 0–45)
BILIRUB SERPL-MCNC: 0.3 MG/DL
BUN SERPL-MCNC: 21.2 MG/DL (ref 6–20)
CALCIUM SERPL-MCNC: 9.4 MG/DL (ref 8.8–10.4)
CHLORIDE SERPL-SCNC: 105 MMOL/L (ref 98–107)
CREAT SERPL-MCNC: 1.33 MG/DL (ref 0.67–1.17)
EGFRCR SERPLBLD CKD-EPI 2021: 71 ML/MIN/1.73M2
GLUCOSE SERPL-MCNC: 83 MG/DL (ref 70–99)
HCO3 SERPL-SCNC: 26 MMOL/L (ref 22–29)
POTASSIUM SERPL-SCNC: 4.5 MMOL/L (ref 3.4–5.3)
PROT SERPL-MCNC: 7.2 G/DL (ref 6.4–8.3)
SODIUM SERPL-SCNC: 140 MMOL/L (ref 135–145)
TSH SERPL DL<=0.005 MIU/L-ACNC: 1.5 UIU/ML (ref 0.3–4.2)
VIT D+METAB SERPL-MCNC: 27 NG/ML (ref 20–50)

## 2025-05-07 ENCOUNTER — RESULTS FOLLOW-UP (OUTPATIENT)
Dept: FAMILY MEDICINE | Facility: CLINIC | Age: 40
End: 2025-05-07

## 2025-05-07 DIAGNOSIS — R79.89 ELEVATED SERUM CREATININE: Primary | ICD-10-CM

## 2025-05-07 NOTE — TELEPHONE ENCOUNTER
Left message for patient to return call. If patient calls back, please route to [facility].     TCs, please send to RNs.    Adamaris Tsai RN  [facility]    Please call patient- creatinine is elevated but stable. Avoid energy drinks, NSAIDs. Drink at least 8 glasses of water/day. Recheck in 3-4 months. Otherwise normal results.

## 2025-05-07 NOTE — TELEPHONE ENCOUNTER
Incoming call from patient. Relayed PCP's detailed message. Schedule pt for lab ONLY appointment in 4 months. No further questions/concerns at this time. Thank you.

## 2025-05-08 ENCOUNTER — PATIENT OUTREACH (OUTPATIENT)
Dept: CARE COORDINATION | Facility: CLINIC | Age: 40
End: 2025-05-08
Payer: COMMERCIAL

## 2025-05-28 DIAGNOSIS — F41.9 ANXIETY AND DEPRESSION: ICD-10-CM

## 2025-05-28 DIAGNOSIS — F32.A ANXIETY AND DEPRESSION: ICD-10-CM

## 2025-05-29 RX ORDER — ESCITALOPRAM OXALATE 20 MG/1
20 TABLET ORAL DAILY
Qty: 30 TABLET | Refills: 0 | OUTPATIENT
Start: 2025-05-29

## 2025-07-22 DIAGNOSIS — R05.1 ACUTE COUGH: ICD-10-CM

## 2025-07-22 NOTE — TELEPHONE ENCOUNTER
Clinic RN: Please investigate patient's chart or contact patient if the information cannot be found because the medication is listed as historical or discontinued. Confirm patient is taking this medication. Document findings and route refill encounter to provider for approval or denial.    Telma Leal RN on 7/22/2025 at 1:23 PM

## 2025-07-24 NOTE — TELEPHONE ENCOUNTER
A message was left for the patient to return a call to the clinic. If the patient calls back, please route to [facility].     TCs, please send to RNs.    Medication not on active med list. Please    Adamaris Tsai RN  [facility]

## 2025-07-28 RX ORDER — ALBUTEROL SULFATE 90 UG/1
2 INHALANT RESPIRATORY (INHALATION) EVERY 6 HOURS
Qty: 18 G | Refills: 0 | OUTPATIENT
Start: 2025-07-28 | End: 2025-08-27

## 2025-07-28 NOTE — TELEPHONE ENCOUNTER
Spoke with the patient who states he is no longer on this medication and that he only used it when he had covid.     Adamaris Tsai RN  [facility]

## 2025-07-30 DIAGNOSIS — F32.A ANXIETY AND DEPRESSION: ICD-10-CM

## 2025-07-30 DIAGNOSIS — F41.9 ANXIETY AND DEPRESSION: ICD-10-CM
